# Patient Record
Sex: MALE | Race: WHITE | Employment: FULL TIME | URBAN - NONMETROPOLITAN AREA
[De-identification: names, ages, dates, MRNs, and addresses within clinical notes are randomized per-mention and may not be internally consistent; named-entity substitution may affect disease eponyms.]

---

## 2017-08-23 ENCOUNTER — TELEPHONE (OUTPATIENT)
Dept: PRIMARY CARE CLINIC | Age: 27
End: 2017-08-23

## 2017-08-23 ENCOUNTER — OFFICE VISIT (OUTPATIENT)
Dept: PRIMARY CARE CLINIC | Age: 27
End: 2017-08-23
Payer: MEDICAID

## 2017-08-23 VITALS
OXYGEN SATURATION: 96 % | BODY MASS INDEX: 38.17 KG/M2 | HEART RATE: 78 BPM | DIASTOLIC BLOOD PRESSURE: 86 MMHG | TEMPERATURE: 98 F | SYSTOLIC BLOOD PRESSURE: 136 MMHG | HEIGHT: 74 IN | WEIGHT: 297.4 LBS

## 2017-08-23 DIAGNOSIS — F41.9 ANXIETY: ICD-10-CM

## 2017-08-23 DIAGNOSIS — E55.9 VITAMIN D DEFICIENCY: ICD-10-CM

## 2017-08-23 DIAGNOSIS — F32.A DEPRESSION, UNSPECIFIED DEPRESSION TYPE: ICD-10-CM

## 2017-08-23 DIAGNOSIS — R45.4 OUTBURSTS OF ANGER: Primary | ICD-10-CM

## 2017-08-23 DIAGNOSIS — Z11.4 SCREENING FOR HIV WITHOUT PRESENCE OF RISK FACTORS: ICD-10-CM

## 2017-08-23 PROCEDURE — 99214 OFFICE O/P EST MOD 30 MIN: CPT | Performed by: NURSE PRACTITIONER

## 2017-08-23 RX ORDER — ESCITALOPRAM OXALATE 10 MG/1
10 TABLET ORAL DAILY
Qty: 30 TABLET | Refills: 3 | Status: SHIPPED | OUTPATIENT
Start: 2017-08-23 | End: 2017-12-15 | Stop reason: SDUPTHER

## 2017-08-23 ASSESSMENT — ENCOUNTER SYMPTOMS
GASTROINTESTINAL NEGATIVE: 1
RESPIRATORY NEGATIVE: 1
EYES NEGATIVE: 1

## 2017-08-25 DIAGNOSIS — I10 ESSENTIAL HYPERTENSION: Primary | ICD-10-CM

## 2017-08-25 DIAGNOSIS — E66.3 OVERWEIGHT: ICD-10-CM

## 2017-08-25 DIAGNOSIS — R53.83 FATIGUE, UNSPECIFIED TYPE: ICD-10-CM

## 2017-08-25 DIAGNOSIS — Z13.220 SCREENING, LIPID: ICD-10-CM

## 2017-08-28 DIAGNOSIS — R53.83 FATIGUE, UNSPECIFIED TYPE: ICD-10-CM

## 2017-08-28 DIAGNOSIS — E66.3 OVERWEIGHT: ICD-10-CM

## 2017-08-28 DIAGNOSIS — Z13.220 SCREENING, LIPID: ICD-10-CM

## 2017-08-28 DIAGNOSIS — Z11.4 SCREENING FOR HIV WITHOUT PRESENCE OF RISK FACTORS: ICD-10-CM

## 2017-08-28 LAB
ALBUMIN SERPL-MCNC: 4.3 G/DL (ref 3.5–5.2)
ALP BLD-CCNC: 61 U/L (ref 40–130)
ALT SERPL-CCNC: 15 U/L (ref 5–41)
ANION GAP SERPL CALCULATED.3IONS-SCNC: 14 MMOL/L (ref 7–19)
AST SERPL-CCNC: 14 U/L (ref 5–40)
BILIRUB SERPL-MCNC: 0.8 MG/DL (ref 0.2–1.2)
BUN BLDV-MCNC: 11 MG/DL (ref 6–20)
CALCIUM SERPL-MCNC: 9.7 MG/DL (ref 8.6–10)
CHLORIDE BLD-SCNC: 101 MMOL/L (ref 98–111)
CHOLESTEROL, TOTAL: 181 MG/DL (ref 160–199)
CO2: 28 MMOL/L (ref 22–29)
CREAT SERPL-MCNC: 0.8 MG/DL (ref 0.5–1.2)
GFR NON-AFRICAN AMERICAN: >60
GLUCOSE BLD-MCNC: 95 MG/DL (ref 74–109)
HBA1C MFR BLD: 5 %
HCT VFR BLD CALC: 47.8 % (ref 42–52)
HDLC SERPL-MCNC: 37 MG/DL (ref 55–121)
HEMOGLOBIN: 15.9 G/DL (ref 14–18)
LDL CHOLESTEROL CALCULATED: 126 MG/DL
MCH RBC QN AUTO: 29.6 PG (ref 27–31)
MCHC RBC AUTO-ENTMCNC: 33.3 G/DL (ref 33–37)
MCV RBC AUTO: 89 FL (ref 80–94)
PDW BLD-RTO: 13.4 % (ref 11.5–14.5)
PLATELET # BLD: 302 K/UL (ref 130–400)
PMV BLD AUTO: 11.4 FL (ref 9.4–12.4)
POTASSIUM SERPL-SCNC: 4 MMOL/L (ref 3.5–5)
RBC # BLD: 5.37 M/UL (ref 4.7–6.1)
SODIUM BLD-SCNC: 143 MMOL/L (ref 136–145)
TOTAL PROTEIN: 7.7 G/DL (ref 6.6–8.7)
TRIGL SERPL-MCNC: 89 MG/DL (ref 150–199)
TSH SERPL DL<=0.05 MIU/L-ACNC: 1.84 UIU/ML (ref 0.27–4.2)
VITAMIN B-12: 333 PG/ML (ref 211–946)
WBC # BLD: 7.7 K/UL (ref 4.8–10.8)

## 2017-08-29 LAB — HIV-1 AND HIV-2 ANTIBODIES: NEGATIVE

## 2017-09-22 ENCOUNTER — OFFICE VISIT (OUTPATIENT)
Dept: PRIMARY CARE CLINIC | Age: 27
End: 2017-09-22
Payer: MEDICAID

## 2017-09-22 VITALS
DIASTOLIC BLOOD PRESSURE: 82 MMHG | BODY MASS INDEX: 36.83 KG/M2 | SYSTOLIC BLOOD PRESSURE: 118 MMHG | OXYGEN SATURATION: 98 % | WEIGHT: 287 LBS | HEART RATE: 73 BPM | HEIGHT: 74 IN | TEMPERATURE: 97.7 F

## 2017-09-22 DIAGNOSIS — Z30.09 SCREENING AND EVALUATION FOR VASECTOMY: ICD-10-CM

## 2017-09-22 DIAGNOSIS — F41.9 ANXIETY: Primary | ICD-10-CM

## 2017-09-22 DIAGNOSIS — R45.4 OUTBURSTS OF ANGER: ICD-10-CM

## 2017-09-22 DIAGNOSIS — Z78.9 RELIES ON VASECTOMY AS PRIMARY BIRTH CONTROL METHOD: ICD-10-CM

## 2017-09-22 PROCEDURE — 99214 OFFICE O/P EST MOD 30 MIN: CPT | Performed by: NURSE PRACTITIONER

## 2017-09-22 RX ORDER — ERGOCALCIFEROL 1.25 MG/1
50000 CAPSULE ORAL WEEKLY
COMMUNITY
End: 2022-10-03

## 2017-09-22 ASSESSMENT — ENCOUNTER SYMPTOMS
EYES NEGATIVE: 1
RESPIRATORY NEGATIVE: 1
GASTROINTESTINAL NEGATIVE: 1

## 2017-10-04 ENCOUNTER — OFFICE VISIT (OUTPATIENT)
Dept: UROLOGY | Facility: CLINIC | Age: 27
End: 2017-10-04

## 2017-10-04 VITALS
SYSTOLIC BLOOD PRESSURE: 138 MMHG | BODY MASS INDEX: 37.35 KG/M2 | HEIGHT: 74 IN | TEMPERATURE: 97.7 F | WEIGHT: 291 LBS | DIASTOLIC BLOOD PRESSURE: 86 MMHG

## 2017-10-04 DIAGNOSIS — Z30.09 ENCOUNTER FOR VASECTOMY COUNSELING: Primary | ICD-10-CM

## 2017-10-04 PROCEDURE — 99203 OFFICE O/P NEW LOW 30 MIN: CPT | Performed by: UROLOGY

## 2017-10-04 RX ORDER — ESCITALOPRAM OXALATE 10 MG/1
TABLET ORAL
COMMUNITY
Start: 2017-08-23

## 2017-10-04 RX ORDER — NICOTINE POLACRILEX 4 MG/1
GUM, CHEWING ORAL
COMMUNITY
Start: 2016-05-13

## 2017-10-04 RX ORDER — ERGOCALCIFEROL 1.25 MG/1
CAPSULE ORAL
COMMUNITY

## 2017-10-04 RX ORDER — HYDROCODONE BITARTRATE AND ACETAMINOPHEN 7.5; 325 MG/1; MG/1
1 TABLET ORAL EVERY 6 HOURS PRN
Qty: 24 TABLET | Refills: 0 | Status: SHIPPED | OUTPATIENT
Start: 2017-10-04 | End: 2017-11-22

## 2017-10-04 RX ORDER — DIAZEPAM 10 MG/1
10 TABLET ORAL 2 TIMES DAILY PRN
Qty: 1 TABLET | Refills: 0 | Status: SHIPPED | OUTPATIENT
Start: 2017-10-04 | End: 2017-11-22

## 2017-11-13 ENCOUNTER — OFFICE VISIT (OUTPATIENT)
Dept: UROLOGY | Facility: CLINIC | Age: 27
End: 2017-11-13

## 2017-11-13 DIAGNOSIS — Z30.2 ENCOUNTER FOR VASECTOMY: Primary | ICD-10-CM

## 2017-11-13 PROCEDURE — 55250 REMOVAL OF SPERM DUCT(S): CPT | Performed by: UROLOGY

## 2017-11-15 RX ORDER — PERMETHRIN 50 MG/G
CREAM TOPICAL
Qty: 60 G | Refills: 1 | Status: SHIPPED | OUTPATIENT
Start: 2017-11-15 | End: 2022-10-03

## 2017-11-15 RX ORDER — HYDROXYZINE HYDROCHLORIDE 25 MG/1
25 TABLET, FILM COATED ORAL 3 TIMES DAILY PRN
Qty: 30 TABLET | Refills: 1 | Status: SHIPPED | OUTPATIENT
Start: 2017-11-15 | End: 2017-11-25

## 2017-11-20 ENCOUNTER — TELEPHONE (OUTPATIENT)
Dept: UROLOGY | Facility: CLINIC | Age: 27
End: 2017-11-20

## 2017-11-22 ENCOUNTER — OFFICE VISIT (OUTPATIENT)
Dept: UROLOGY | Facility: CLINIC | Age: 27
End: 2017-11-22

## 2017-11-22 VITALS — BODY MASS INDEX: 36.63 KG/M2 | WEIGHT: 276.4 LBS | HEIGHT: 73 IN | TEMPERATURE: 97.4 F

## 2017-11-22 DIAGNOSIS — S30.22XA HEMATOMA OF SCROTUM: Primary | ICD-10-CM

## 2017-11-22 DIAGNOSIS — N45.1 EPIDIDYMITIS: ICD-10-CM

## 2017-11-22 LAB
BILIRUB BLD-MCNC: ABNORMAL MG/DL
CLARITY, POC: CLEAR
COLOR UR: YELLOW
GLUCOSE UR STRIP-MCNC: NEGATIVE MG/DL
KETONES UR QL: NEGATIVE
LEUKOCYTE EST, POC: NEGATIVE
NITRITE UR-MCNC: NEGATIVE MG/ML
PH UR: 5 [PH] (ref 5–8)
PROT UR STRIP-MCNC: ABNORMAL MG/DL
RBC # UR STRIP: NEGATIVE /UL
SP GR UR: 1.03 (ref 1–1.03)
UROBILINOGEN UR QL: ABNORMAL

## 2017-11-22 PROCEDURE — 81003 URINALYSIS AUTO W/O SCOPE: CPT | Performed by: UROLOGY

## 2017-11-22 PROCEDURE — 99024 POSTOP FOLLOW-UP VISIT: CPT | Performed by: UROLOGY

## 2017-11-22 RX ORDER — CIPROFLOXACIN 500 MG/1
500 TABLET, FILM COATED ORAL 2 TIMES DAILY
Qty: 28 TABLET | Refills: 0 | Status: SHIPPED | OUTPATIENT
Start: 2017-11-22 | End: 2017-12-06

## 2017-11-30 ENCOUNTER — OFFICE VISIT (OUTPATIENT)
Dept: PRIMARY CARE CLINIC | Age: 27
End: 2017-11-30
Payer: MEDICAID

## 2017-11-30 VITALS
BODY MASS INDEX: 37.11 KG/M2 | TEMPERATURE: 97.4 F | RESPIRATION RATE: 18 BRPM | HEART RATE: 60 BPM | HEIGHT: 73 IN | WEIGHT: 280 LBS | OXYGEN SATURATION: 96 % | SYSTOLIC BLOOD PRESSURE: 112 MMHG | DIASTOLIC BLOOD PRESSURE: 79 MMHG

## 2017-11-30 DIAGNOSIS — B35.9 TINEA: Primary | ICD-10-CM

## 2017-11-30 PROCEDURE — 99213 OFFICE O/P EST LOW 20 MIN: CPT | Performed by: NURSE PRACTITIONER

## 2017-11-30 PROCEDURE — G8484 FLU IMMUNIZE NO ADMIN: HCPCS | Performed by: NURSE PRACTITIONER

## 2017-11-30 PROCEDURE — 4004F PT TOBACCO SCREEN RCVD TLK: CPT | Performed by: NURSE PRACTITIONER

## 2017-11-30 PROCEDURE — G8417 CALC BMI ABV UP PARAM F/U: HCPCS | Performed by: NURSE PRACTITIONER

## 2017-11-30 PROCEDURE — G8427 DOCREV CUR MEDS BY ELIG CLIN: HCPCS | Performed by: NURSE PRACTITIONER

## 2017-11-30 RX ORDER — TERBINAFINE HYDROCHLORIDE 250 MG/1
250 TABLET ORAL DAILY
Qty: 30 TABLET | Refills: 1 | Status: SHIPPED | OUTPATIENT
Start: 2017-11-30 | End: 2022-10-03

## 2017-11-30 ASSESSMENT — ENCOUNTER SYMPTOMS
GASTROINTESTINAL NEGATIVE: 1
RESPIRATORY NEGATIVE: 1
EYES NEGATIVE: 1

## 2017-11-30 NOTE — PROGRESS NOTES
[facility]  [address]  [address] [facility] [address]  Dept: [phone]  Dept Fax: [phone]  Loc: [phone]    Anuja Ospina is a 32 y.o. male who presents today for his medical conditions/complaints as noted below. Anuja Ospina is c/o of Other (itching)      Chief Complaint   Patient presents with    Other     itching       HPI:     HPI   Patient here for follow up on rash. Patient reports using Elimite cream as directed without much relief. He has had to use Atarax nightly due to the itching as well. He denies that is spreading, though it is not improving. Past Medical History:   Diagnosis Date    BiPAP (biphasic positive airway pressure) dependence     16cm/12cm    GERD (gastroesophageal reflux disease)     Hypertension     Obstructive sleep apnea     AHI:  24.3    Restless leg syndrome         Past Surgical History:   Procedure Laterality Date    CYST REMOVAL      lower back    HAND SURGERY      5 total surgeries    SLEEVE GASTRECTOMY  03/2017       Social History   Substance Use Topics    Smoking status: Never Smoker    Smokeless tobacco: Current User     Types: Chew    Alcohol use 0.0 oz/week      Comment: rarely        Current Outpatient Prescriptions   Medication Sig Dispense Refill    terbinafine (LAMISIL) 250 MG tablet Take 1 tablet by mouth daily 30 tablet 1    permethrin (ELIMITE) 5 % cream Apply topically as directed. May repeat in 14 days if not better. 60 g 1    vitamin D (ERGOCALCIFEROL) 45141 units CAPS capsule Take 50,000 Units by mouth once a week      escitalopram (LEXAPRO) 10 MG tablet Take 1 tablet by mouth daily 30 tablet 3    Omeprazole 20 MG TBEC Take 1 capsule by mouth daily 30 tablet 5     No current facility-administered medications for this visit.         No Known Allergies    Family History   Problem Relation Age of Onset    Thyroid Disease Mother     High Blood Pressure Mother     High Blood

## 2017-12-15 RX ORDER — ESCITALOPRAM OXALATE 20 MG/1
20 TABLET ORAL DAILY
Qty: 30 TABLET | Refills: 1 | Status: SHIPPED | OUTPATIENT
Start: 2017-12-15 | End: 2018-01-03 | Stop reason: SDUPTHER

## 2018-01-03 RX ORDER — ESCITALOPRAM OXALATE 20 MG/1
20 TABLET ORAL DAILY
Qty: 30 TABLET | Refills: 1 | Status: SHIPPED | OUTPATIENT
Start: 2018-01-03 | End: 2022-10-03

## 2018-01-10 ENCOUNTER — TELEPHONE (OUTPATIENT)
Dept: UROLOGY | Facility: CLINIC | Age: 28
End: 2018-01-10

## 2018-02-08 ENCOUNTER — TELEPHONE (OUTPATIENT)
Dept: UROLOGY | Facility: CLINIC | Age: 28
End: 2018-02-08

## 2020-02-24 ENCOUNTER — OFFICE VISIT (OUTPATIENT)
Dept: UROLOGY | Facility: CLINIC | Age: 30
End: 2020-02-24

## 2020-02-24 VITALS — HEIGHT: 74 IN | WEIGHT: 239 LBS | BODY MASS INDEX: 30.67 KG/M2 | TEMPERATURE: 96.8 F

## 2020-02-24 DIAGNOSIS — I86.1 VARICOCELE: Primary | ICD-10-CM

## 2020-02-24 DIAGNOSIS — N50.89 SPERM GRANULOMA: ICD-10-CM

## 2020-02-24 PROCEDURE — 99213 OFFICE O/P EST LOW 20 MIN: CPT | Performed by: UROLOGY

## 2020-02-24 RX ORDER — SERTRALINE HYDROCHLORIDE 25 MG/1
25 TABLET, FILM COATED ORAL DAILY
COMMUNITY

## 2020-08-27 ENCOUNTER — TELEPHONE (OUTPATIENT)
Dept: UROLOGY | Facility: CLINIC | Age: 30
End: 2020-08-27

## 2020-08-27 DIAGNOSIS — N50.89 SPERM GRANULOMA: Primary | ICD-10-CM

## 2020-08-28 ENCOUNTER — LAB (OUTPATIENT)
Dept: LAB | Facility: HOSPITAL | Age: 30
End: 2020-08-28

## 2020-08-28 DIAGNOSIS — N50.89 SPERM GRANULOMA: ICD-10-CM

## 2020-08-28 LAB — SPERM - POST VASECTOMY: NORMAL

## 2020-08-28 PROCEDURE — 89321 SEMEN ANAL SPERM DETECTION: CPT | Performed by: UROLOGY

## 2020-08-31 ENCOUNTER — TELEPHONE (OUTPATIENT)
Dept: UROLOGY | Facility: CLINIC | Age: 30
End: 2020-08-31

## 2020-09-15 ENCOUNTER — TRANSCRIBE ORDERS (OUTPATIENT)
Dept: PHYSICAL THERAPY | Facility: CLINIC | Age: 30
End: 2020-09-15

## 2020-09-15 DIAGNOSIS — H43.392 VISUAL FLOATERS, LEFT: ICD-10-CM

## 2020-09-15 DIAGNOSIS — H53.2 DIPLOPIA: ICD-10-CM

## 2020-09-15 DIAGNOSIS — S06.0X0A CONCUSSION WITH NO LOSS OF CONSCIOUSNESS, INITIAL ENCOUNTER: Primary | ICD-10-CM

## 2020-09-15 DIAGNOSIS — H51.11 CONVERGENCE INSUFFICIENCY: ICD-10-CM

## 2020-09-15 DIAGNOSIS — G44.329 CHRONIC POST-TRAUMATIC HEADACHE, NOT INTRACTABLE: ICD-10-CM

## 2020-09-15 DIAGNOSIS — R94.113 ABNORMAL RESULT OF EVALUATION OF OCULOMOTOR SYSTEM: ICD-10-CM

## 2020-09-28 ENCOUNTER — TREATMENT (OUTPATIENT)
Dept: PHYSICAL THERAPY | Facility: CLINIC | Age: 30
End: 2020-09-28

## 2020-09-28 DIAGNOSIS — G44.309 POST-TRAUMATIC HEADACHE, NOT INTRACTABLE, UNSPECIFIED CHRONICITY PATTERN: ICD-10-CM

## 2020-09-28 DIAGNOSIS — S06.0X0A CONCUSSION WITHOUT LOSS OF CONSCIOUSNESS, INITIAL ENCOUNTER: ICD-10-CM

## 2020-09-28 DIAGNOSIS — H51.11 CONVERGENCE INSUFFICIENCY: Primary | ICD-10-CM

## 2020-09-28 PROCEDURE — 97162 PT EVAL MOD COMPLEX 30 MIN: CPT | Performed by: PHYSICAL THERAPIST

## 2022-10-03 ENCOUNTER — OFFICE VISIT (OUTPATIENT)
Dept: FAMILY MEDICINE CLINIC | Age: 32
End: 2022-10-03
Payer: COMMERCIAL

## 2022-10-03 VITALS
OXYGEN SATURATION: 99 % | BODY MASS INDEX: 27.21 KG/M2 | TEMPERATURE: 97.3 F | HEART RATE: 74 BPM | DIASTOLIC BLOOD PRESSURE: 66 MMHG | WEIGHT: 212 LBS | HEIGHT: 74 IN | SYSTOLIC BLOOD PRESSURE: 118 MMHG

## 2022-10-03 DIAGNOSIS — Z98.84 HX OF BARIATRIC SURGERY: ICD-10-CM

## 2022-10-03 DIAGNOSIS — J45.20 MILD INTERMITTENT ASTHMA, UNSPECIFIED WHETHER COMPLICATED: ICD-10-CM

## 2022-10-03 DIAGNOSIS — Z00.00 ENCOUNTER FOR WELLNESS EXAMINATION IN ADULT: ICD-10-CM

## 2022-10-03 DIAGNOSIS — Z00.00 ENCOUNTER FOR WELLNESS EXAMINATION IN ADULT: Primary | ICD-10-CM

## 2022-10-03 DIAGNOSIS — R41.840 ATTENTION DEFICIT: ICD-10-CM

## 2022-10-03 LAB
ALBUMIN SERPL-MCNC: 4.6 G/DL (ref 3.5–5.2)
ALP BLD-CCNC: 41 U/L (ref 40–130)
ALT SERPL-CCNC: 22 U/L (ref 5–41)
ANION GAP SERPL CALCULATED.3IONS-SCNC: 15 MMOL/L (ref 7–19)
AST SERPL-CCNC: 21 U/L (ref 5–40)
BASOPHILS ABSOLUTE: 0.1 K/UL (ref 0–0.2)
BASOPHILS RELATIVE PERCENT: 1.3 % (ref 0–1)
BILIRUB SERPL-MCNC: 0.5 MG/DL (ref 0.2–1.2)
BUN BLDV-MCNC: 9 MG/DL (ref 6–20)
CALCIUM SERPL-MCNC: 9.2 MG/DL (ref 8.6–10)
CHLORIDE BLD-SCNC: 105 MMOL/L (ref 98–111)
CHOLESTEROL, TOTAL: 192 MG/DL (ref 160–199)
CO2: 27 MMOL/L (ref 22–29)
CREAT SERPL-MCNC: 0.8 MG/DL (ref 0.5–1.2)
EOSINOPHILS ABSOLUTE: 0.6 K/UL (ref 0–0.6)
EOSINOPHILS RELATIVE PERCENT: 8.6 % (ref 0–5)
FOLATE: 6.5 NG/ML (ref 4.5–32.2)
GFR AFRICAN AMERICAN: >59
GFR NON-AFRICAN AMERICAN: >60
GLUCOSE BLD-MCNC: 84 MG/DL (ref 74–109)
HCT VFR BLD CALC: 46.1 % (ref 42–52)
HDLC SERPL-MCNC: 64 MG/DL (ref 55–121)
HEMOGLOBIN: 14.7 G/DL (ref 14–18)
IMMATURE GRANULOCYTES #: 0 K/UL
IRON SATURATION: 25 % (ref 14–50)
IRON: 90 UG/DL (ref 59–158)
LDL CHOLESTEROL CALCULATED: 114 MG/DL
LYMPHOCYTES ABSOLUTE: 1.9 K/UL (ref 1.1–4.5)
LYMPHOCYTES RELATIVE PERCENT: 28.2 % (ref 20–40)
MCH RBC QN AUTO: 30.6 PG (ref 27–31)
MCHC RBC AUTO-ENTMCNC: 31.9 G/DL (ref 33–37)
MCV RBC AUTO: 96 FL (ref 80–94)
MONOCYTES ABSOLUTE: 0.7 K/UL (ref 0–0.9)
MONOCYTES RELATIVE PERCENT: 9.9 % (ref 0–10)
NEUTROPHILS ABSOLUTE: 3.5 K/UL (ref 1.5–7.5)
NEUTROPHILS RELATIVE PERCENT: 51.7 % (ref 50–65)
PDW BLD-RTO: 13 % (ref 11.5–14.5)
PLATELET # BLD: 264 K/UL (ref 130–400)
PMV BLD AUTO: 11.7 FL (ref 9.4–12.4)
POTASSIUM SERPL-SCNC: 4.4 MMOL/L (ref 3.5–5)
RBC # BLD: 4.8 M/UL (ref 4.7–6.1)
SODIUM BLD-SCNC: 147 MMOL/L (ref 136–145)
TOTAL IRON BINDING CAPACITY: 354 UG/DL (ref 250–400)
TOTAL PROTEIN: 7 G/DL (ref 6.6–8.7)
TRIGL SERPL-MCNC: 70 MG/DL (ref 0–149)
TSH SERPL DL<=0.05 MIU/L-ACNC: 1.57 UIU/ML (ref 0.27–4.2)
VITAMIN B-12: 291 PG/ML (ref 211–946)
VITAMIN D 25-HYDROXY: 25.2 NG/ML
WBC # BLD: 6.8 K/UL (ref 4.8–10.8)

## 2022-10-03 PROCEDURE — 99395 PREV VISIT EST AGE 18-39: CPT | Performed by: FAMILY MEDICINE

## 2022-10-03 RX ORDER — ALBUTEROL SULFATE 90 UG/1
2 AEROSOL, METERED RESPIRATORY (INHALATION) EVERY 6 HOURS PRN
Qty: 18 G | Refills: 3 | Status: SHIPPED | OUTPATIENT
Start: 2022-10-03

## 2022-10-03 RX ORDER — ALBUTEROL SULFATE 2.5 MG/3ML
2.5 SOLUTION RESPIRATORY (INHALATION) EVERY 6 HOURS PRN
Qty: 120 EACH | Refills: 3 | Status: SHIPPED | OUTPATIENT
Start: 2022-10-03

## 2022-10-03 ASSESSMENT — PATIENT HEALTH QUESTIONNAIRE - PHQ9
1. LITTLE INTEREST OR PLEASURE IN DOING THINGS: 0
SUM OF ALL RESPONSES TO PHQ QUESTIONS 1-9: 0
SUM OF ALL RESPONSES TO PHQ9 QUESTIONS 1 & 2: 0
2. FEELING DOWN, DEPRESSED OR HOPELESS: 0
SUM OF ALL RESPONSES TO PHQ QUESTIONS 1-9: 0

## 2022-10-03 ASSESSMENT — ENCOUNTER SYMPTOMS
ABDOMINAL PAIN: 0
DIARRHEA: 0
CONSTIPATION: 0
NAUSEA: 0
RHINORRHEA: 0
COUGH: 0
VOMITING: 0
SHORTNESS OF BREATH: 0
BACK PAIN: 0

## 2022-10-03 NOTE — PROGRESS NOTES
10/3/2022    Soha Padron (:  1990) is a 28 y.o. male, here for a preventive medicine evaluation. Patient Active Problem List   Diagnosis    Hypertension    GERD (gastroesophageal reflux disease)    Obstructive sleep apnea    Restless leg syndrome    Vitamin D deficiency    Anxiety     Patient reports that he has a history of attention deficit concerns with scattered brain and has some issues with anxiety. He just feels like too many things are going on and has problems retaining his retentions. He does state that he gets distracted easily. He states that things take him longer to complete and that makes him frustrated and mad. Has a history of asthma and states that he has been doing better and hasn't had much of an issue. He states that he does occasionally need/use albuterol but is not a regular thing for him. He does have history of gastric sleeve and has been doing well but his appetite isn't really the greatest but is doing well. He states that his sleep is doing okay. He does stay active and does exercise. Review of Systems   Constitutional:  Negative for activity change, appetite change, fatigue and fever. HENT:  Negative for congestion and rhinorrhea. Respiratory:  Negative for cough and shortness of breath. Cardiovascular:  Negative for chest pain and palpitations. Gastrointestinal:  Negative for abdominal pain, constipation, diarrhea, nausea and vomiting. Genitourinary:  Negative for dysuria and hematuria. Musculoskeletal:  Negative for back pain, gait problem and neck pain. Skin:  Negative for rash and wound. Neurological:  Negative for syncope and weakness. Hematological:  Negative for adenopathy. Does not bruise/bleed easily. Psychiatric/Behavioral:  Positive for decreased concentration. Negative for dysphoric mood and sleep disturbance. The patient is not nervous/anxious. Prior to Visit Medications    Medication Sig Taking?  Authorizing Provider   albuterol (PROVENTIL) (2.5 MG/3ML) 0.083% nebulizer solution Take 3 mLs by nebulization every 6 hours as needed for Wheezing Yes Lyndsey Tao MD   albuterol sulfate HFA (PROVENTIL HFA) 108 (90 Base) MCG/ACT inhaler Inhale 2 puffs into the lungs every 6 hours as needed for Wheezing Yes Lyndsey Tao MD        No Known Allergies    Past Medical History:   Diagnosis Date    BiPAP (biphasic positive airway pressure) dependence     16cm/12cm    GERD (gastroesophageal reflux disease)     Hypertension     Obstructive sleep apnea     AHI:  24.3    Restless leg syndrome        Past Surgical History:   Procedure Laterality Date    CYST REMOVAL      lower back    HAND SURGERY      5 total surgeries    SLEEVE GASTRECTOMY  03/2017       Social History     Socioeconomic History    Marital status:      Spouse name: Not on file    Number of children: Not on file    Years of education: Not on file    Highest education level: Not on file   Occupational History    Not on file   Tobacco Use    Smoking status: Never    Smokeless tobacco: Current     Types: Chew   Substance and Sexual Activity    Alcohol use:  Yes     Alcohol/week: 0.0 standard drinks     Comment: rarely    Drug use: No    Sexual activity: Yes     Partners: Female   Other Topics Concern    Not on file   Social History Narrative    Not on file     Social Determinants of Health     Financial Resource Strain: Not on file   Food Insecurity: Not on file   Transportation Needs: Not on file   Physical Activity: Not on file   Stress: Not on file   Social Connections: Not on file   Intimate Partner Violence: Not on file   Housing Stability: Not on file        Family History   Problem Relation Age of Onset    Thyroid Disease Mother     High Blood Pressure Mother     High Blood Pressure Father     Cancer Father     Thyroid Disease Sister        ADVANCE DIRECTIVE: N, <no information>    Vitals:    10/03/22 0726   BP: 118/66   Site: Left Upper Arm   Position: Sitting Cuff Size: Large Adult   Pulse: 74   Temp: 97.3 °F (36.3 °C)   TempSrc: Temporal   SpO2: 99%   Weight: 212 lb (96.2 kg)   Height: 6' 2\" (1.88 m)     Estimated body mass index is 27.22 kg/m² as calculated from the following:    Height as of this encounter: 6' 2\" (1.88 m). Weight as of this encounter: 212 lb (96.2 kg). Physical Exam  Vitals and nursing note reviewed. Constitutional:       General: He is not in acute distress. Appearance: Normal appearance. He is well-developed. He is not diaphoretic. HENT:      Head: Normocephalic and atraumatic. Right Ear: External ear normal.      Left Ear: External ear normal.      Nose: Nose normal.      Mouth/Throat:      Mouth: Mucous membranes are moist.      Pharynx: Oropharynx is clear. No oropharyngeal exudate. Eyes:      General: No scleral icterus. Right eye: No discharge. Left eye: No discharge. Conjunctiva/sclera: Conjunctivae normal.   Neck:      Trachea: No tracheal deviation. Cardiovascular:      Rate and Rhythm: Normal rate and regular rhythm. Heart sounds: Normal heart sounds. No murmur heard. No friction rub. No gallop. Pulmonary:      Effort: Pulmonary effort is normal. No respiratory distress. Breath sounds: Normal breath sounds. No wheezing or rales. Abdominal:      General: Bowel sounds are normal. There is no distension. Palpations: Abdomen is soft. Tenderness: There is no abdominal tenderness. Musculoskeletal:         General: No deformity (No gross deformities of upper or lower extremities) or signs of injury. Normal range of motion. Cervical back: Normal range of motion and neck supple. No muscular tenderness. Right lower leg: No edema. Left lower leg: No edema. Lymphadenopathy:      Cervical: No cervical adenopathy. Skin:     General: Skin is warm and dry. Findings: No erythema or rash.    Neurological:      Mental Status: He is alert and oriented to person, place, and time. Cranial Nerves: No cranial nerve deficit. Psychiatric:         Mood and Affect: Mood normal.         Behavior: Behavior normal.         Thought Content: Thought content normal.       No flowsheet data found. Lab Results   Component Value Date/Time    CHOL 181 08/28/2017 08:38 AM    CHOL 176 05/13/2016 10:19 AM    TRIG 89 08/28/2017 08:38 AM    TRIG 55 05/13/2016 10:19 AM    HDL 37 08/28/2017 08:38 AM    HDL 41 05/13/2016 10:19 AM    LDLCALC 126 08/28/2017 08:38 AM    LDLCALC 124 05/13/2016 10:19 AM    GLUCOSE 86 11/30/2017 09:04 AM    LABA1C 5.0 08/28/2017 08:38 AM       The ASCVD Risk score (Shay CHAPARRO, et al., 2019) failed to calculate for the following reasons: The 2019 ASCVD risk score is only valid for ages 36 to 78      There is no immunization history on file for this patient. Health Maintenance   Topic Date Due    COVID-19 Vaccine (1) Never done    Varicella vaccine (1 of 2 - 2-dose childhood series) Never done    Depression Screen  Never done    Hepatitis C screen  Never done    DTaP/Tdap/Td vaccine (1 - Tdap) Never done    Flu vaccine (1) Never done    HIV screen  Completed    Hepatitis A vaccine  Aged Out    Hepatitis B vaccine  Aged Out    Hib vaccine  Aged Out    Meningococcal (ACWY) vaccine  Aged Out    Pneumococcal 0-64 years Vaccine  Aged Out       Assessment & Plan   Encounter for wellness examination in adult  -     CBC with Auto Differential; Future  -     Comprehensive Metabolic Panel; Future  -     Lipid Panel; Future  -     TSH; Future  Attention deficit  -     External Referral To Behavioral Health  Mild intermittent asthma, unspecified whether complicated  -     albuterol (PROVENTIL) (2.5 MG/3ML) 0.083% nebulizer solution; Take 3 mLs by nebulization every 6 hours as needed for Wheezing, Disp-120 each, R-3Normal  -     albuterol sulfate HFA (PROVENTIL HFA) 108 (90 Base) MCG/ACT inhaler;  Inhale 2 puffs into the lungs every 6 hours as needed for Wheezing, Disp-18 g, R-3Normal  Hx of bariatric surgery  -     Folate; Future  -     Vitamin B12; Future  -     Iron and TIBC; Future  -     Vitamin D 25 Hydroxy; Future  Return if symptoms worsen or fail to improve, for next scheduled follow up with PCP. Discussed age-appropriate anticipatory topics (including, but not limited to, continue healthy diet and good water intake, limit soda and junk food, regular exercise, dental care) and gave handout on age appropriate topics  Discussed referral to behavioral health for evaluation of the possibility of ADHD. Discussed potential management options moving forward depending on evaluation results. Discussed lab work for further evaluation. Discussed proper use of albuterol for his asthma history we will continue to monitor with adjustments per dictates. Discussed signs symptoms require medical attention. All questions were answered and patient voiced understanding and agreement with plan as discussed.     --Joshua Carbone MD

## 2022-10-18 ENCOUNTER — TELEPHONE (OUTPATIENT)
Dept: FAMILY MEDICINE CLINIC | Age: 32
End: 2022-10-18

## 2022-10-18 DIAGNOSIS — E55.9 VITAMIN D DEFICIENCY: Primary | ICD-10-CM

## 2022-10-18 RX ORDER — ERGOCALCIFEROL 1.25 MG/1
50000 CAPSULE ORAL WEEKLY
Qty: 12 CAPSULE | Refills: 1 | Status: SHIPPED | OUTPATIENT
Start: 2022-10-18

## 2022-11-22 ENCOUNTER — PATIENT MESSAGE (OUTPATIENT)
Dept: FAMILY MEDICINE CLINIC | Age: 32
End: 2022-11-22

## 2022-12-06 ENCOUNTER — OFFICE VISIT (OUTPATIENT)
Dept: FAMILY MEDICINE CLINIC | Age: 32
End: 2022-12-06
Payer: COMMERCIAL

## 2022-12-06 VITALS
HEIGHT: 74 IN | DIASTOLIC BLOOD PRESSURE: 74 MMHG | WEIGHT: 211 LBS | SYSTOLIC BLOOD PRESSURE: 118 MMHG | HEART RATE: 98 BPM | TEMPERATURE: 98 F | OXYGEN SATURATION: 99 % | BODY MASS INDEX: 27.08 KG/M2

## 2022-12-06 DIAGNOSIS — F90.2 ATTENTION DEFICIT HYPERACTIVITY DISORDER (ADHD), COMBINED TYPE: Primary | ICD-10-CM

## 2022-12-06 DIAGNOSIS — M77.12 LATERAL EPICONDYLITIS OF LEFT ELBOW: ICD-10-CM

## 2022-12-06 PROCEDURE — 3074F SYST BP LT 130 MM HG: CPT | Performed by: FAMILY MEDICINE

## 2022-12-06 PROCEDURE — 99214 OFFICE O/P EST MOD 30 MIN: CPT | Performed by: FAMILY MEDICINE

## 2022-12-06 PROCEDURE — 3078F DIAST BP <80 MM HG: CPT | Performed by: FAMILY MEDICINE

## 2022-12-06 RX ORDER — METHYLPREDNISOLONE 4 MG/1
TABLET ORAL
Qty: 1 KIT | Refills: 0 | Status: SHIPPED | OUTPATIENT
Start: 2022-12-06 | End: 2022-12-12

## 2022-12-06 RX ORDER — DEXTROAMPHETAMINE SACCHARATE, AMPHETAMINE ASPARTATE MONOHYDRATE, DEXTROAMPHETAMINE SULFATE AND AMPHETAMINE SULFATE 5; 5; 5; 5 MG/1; MG/1; MG/1; MG/1
20 CAPSULE, EXTENDED RELEASE ORAL EVERY MORNING
Qty: 30 CAPSULE | Refills: 0 | Status: SHIPPED | OUTPATIENT
Start: 2022-12-06 | End: 2023-12-06

## 2022-12-06 RX ORDER — DICLOFENAC SODIUM 75 MG/1
75 TABLET, DELAYED RELEASE ORAL 2 TIMES DAILY
Qty: 60 TABLET | Refills: 3 | Status: SHIPPED | OUTPATIENT
Start: 2022-12-06

## 2022-12-06 NOTE — PROGRESS NOTES
Doc CALHOUN [facility]  [address] [address]  Dept: [phone]  Dept Fax: [phone]    Visit type: Established patient    Reason for Visit: Discuss Medications (ADHD)         Assessment and Plan       1. Attention deficit hyperactivity disorder (ADHD), combined type  -     amphetamine-dextroamphetamine (ADDERALL XR) 20 MG extended release capsule; Take 1 capsule by mouth every morning., Disp-30 capsule, R-0Normal  2. Lateral epicondylitis of left elbow  -     diclofenac (VOLTAREN) 75 MG EC tablet; Take 1 tablet by mouth 2 times daily, Disp-60 tablet, R-3Normal  -     methylPREDNISolone (MEDROL DOSEPACK) 4 MG tablet; Take by mouth., Disp-1 kit, R-0Normal    With patient being formally diagnosed with ADHD by mental health specialist we discussed medications and through shared decision making determination was to pursue a trial of Adderall in efforts to improve his symptoms. Discussed the potential need for medication adjustments and we will plan on follow-up in 1 month for determination on how the medicine did for many adjustments that might be needed. Discussed potential side effects with the medication. Discussed physical exam findings and suggestive diagnosis of his left elbow pain. Discussed proper care of the involved elbow and expected course. Discussed lifestyle modifications advised beneficial.   Discussed signs and symptoms requiring medical attention. All questions were answered and patient voiced understanding and agreement with plan as discussed. Return in about 4 weeks (around 1/3/2023), or if symptoms worsen or fail to improve. Subjective       HPI   Has been to see [facility] and has been formally diagnosed with ADHD. He states that he has been having problems focusing and staying on task and completing his work.  He states that he is having problems completing his job duties due to getting distracted and jumps around doing stuff instead of getting each item done. He states that at home he is able to cope more with his symptoms and has more of a support symptom to keep him on track. Patient does report that he has been having some pain involving his left elbow more so on the lateral side. Denies any specific injury contributing to the elbow pain. Review of Systems   Constitutional:  Negative for activity change, appetite change, fatigue and fever. HENT:  Negative for congestion and rhinorrhea. Respiratory:  Negative for cough and shortness of breath. Cardiovascular:  Negative for chest pain and palpitations. Gastrointestinal:  Negative for abdominal pain, constipation, diarrhea, nausea and vomiting. Genitourinary:  Negative for dysuria and hematuria. Musculoskeletal:  Positive for arthralgias. Negative for back pain, gait problem and neck pain. Skin:  Negative for rash and wound. Neurological:  Negative for syncope and weakness. Hematological:  Negative for adenopathy. Does not bruise/bleed easily. Psychiatric/Behavioral:  Positive for decreased concentration. Negative for dysphoric mood and sleep disturbance. The patient is not nervous/anxious. No Known Allergies    Outpatient Medications Prior to Visit   Medication Sig Dispense Refill    vitamin D (ERGOCALCIFEROL) 1.25 MG (42290 UT) CAPS capsule Take 1 capsule by mouth once a week 12 capsule 1    albuterol (PROVENTIL) (2.5 MG/3ML) 0.083% nebulizer solution Take 3 mLs by nebulization every 6 hours as needed for Wheezing 120 each 3    albuterol sulfate HFA (PROVENTIL HFA) 108 (90 Base) MCG/ACT inhaler Inhale 2 puffs into the lungs every 6 hours as needed for Wheezing 18 g 3     No facility-administered medications prior to visit.         Past Medical History:   Diagnosis Date    BiPAP (biphasic positive airway pressure) dependence     16cm/12cm    GERD (gastroesophageal reflux disease)     Hypertension     Obstructive sleep apnea     AHI:  24.3    Restless leg syndrome         Social History     Tobacco Use    Smoking status: Never    Smokeless tobacco: Current     Types: Chew   Substance Use Topics    Alcohol use: Yes     Alcohol/week: 0.0 standard drinks     Comment: rarely        Past Surgical History:   Procedure Laterality Date    CYST REMOVAL      lower back    HAND SURGERY      5 total surgeries    SLEEVE GASTRECTOMY  03/2017       Family History   Problem Relation Age of Onset    Thyroid Disease Mother     High Blood Pressure Mother     High Blood Pressure Father     Cancer Father     Thyroid Disease Sister        Objective       /74 (Site: Right Upper Arm, Position: Sitting, Cuff Size: Medium Adult)   Pulse 98   Temp 98 °F (36.7 °C) (Temporal)   Ht 6' 2\" (1.88 m)   Wt 211 lb (95.7 kg)   SpO2 99%   BMI 27.09 kg/m²   Physical Exam  Vitals and nursing note reviewed. Constitutional:       General: He is not in acute distress. Appearance: Normal appearance. He is well-developed. He is not diaphoretic. HENT:      Head: Normocephalic and atraumatic. Right Ear: External ear normal.      Left Ear: External ear normal.      Nose: Nose normal.      Mouth/Throat:      Mouth: Mucous membranes are moist.      Pharynx: Oropharynx is clear. No oropharyngeal exudate. Eyes:      General: No scleral icterus. Right eye: No discharge. Left eye: No discharge. Conjunctiva/sclera: Conjunctivae normal.   Neck:      Trachea: No tracheal deviation. Cardiovascular:      Rate and Rhythm: Normal rate and regular rhythm. Heart sounds: Normal heart sounds. No murmur heard. No friction rub. No gallop. Pulmonary:      Effort: Pulmonary effort is normal. No respiratory distress. Breath sounds: Normal breath sounds. No wheezing or rales. Abdominal:      General: Bowel sounds are normal. There is no distension. Palpations: Abdomen is soft. Tenderness: There is no abdominal tenderness. Musculoskeletal:         General: Tenderness present.  No deformity (No gross deformities of upper or lower extremities) or signs of injury. Normal range of motion. Cervical back: Normal range of motion and neck supple. No muscular tenderness. Right lower leg: No edema. Left lower leg: No edema. Comments: Tenderness on the lateral aspect of his left elbow consistent with tennis elbow   Lymphadenopathy:      Cervical: No cervical adenopathy. Skin:     General: Skin is warm and dry. Findings: No erythema or rash. Neurological:      Mental Status: He is alert and oriented to person, place, and time. Cranial Nerves: No cranial nerve deficit. Psychiatric:         Mood and Affect: Mood normal.         Behavior: Behavior normal.         Thought Content:  Thought content normal.         Data Reviewed and Summarized       Labs:     Imaging/Testing:        Quinn Seaman MD

## 2022-12-19 NOTE — TELEPHONE ENCOUNTER
From: Anibal Avery  To: Dr. Radha Sarabia: 11/22/2022 11:01 AM CST  Subject: ADHD Evaluation     Wondering if this is what you need from [facility] this is all they gave me

## 2022-12-23 ASSESSMENT — ENCOUNTER SYMPTOMS
SHORTNESS OF BREATH: 0
VOMITING: 0
BACK PAIN: 0
RHINORRHEA: 0
CONSTIPATION: 0
DIARRHEA: 0
COUGH: 0
ABDOMINAL PAIN: 0
NAUSEA: 0

## 2023-01-09 DIAGNOSIS — F90.2 ATTENTION DEFICIT HYPERACTIVITY DISORDER (ADHD), COMBINED TYPE: ICD-10-CM

## 2023-01-09 RX ORDER — DEXTROAMPHETAMINE SACCHARATE, AMPHETAMINE ASPARTATE MONOHYDRATE, DEXTROAMPHETAMINE SULFATE AND AMPHETAMINE SULFATE 5; 5; 5; 5 MG/1; MG/1; MG/1; MG/1
20 CAPSULE, EXTENDED RELEASE ORAL EVERY MORNING
Qty: 30 CAPSULE | Refills: 0 | Status: SHIPPED | OUTPATIENT
Start: 2023-01-09 | End: 2024-01-09

## 2023-01-09 RX ORDER — DEXTROAMPHETAMINE SACCHARATE, AMPHETAMINE ASPARTATE MONOHYDRATE, DEXTROAMPHETAMINE SULFATE AND AMPHETAMINE SULFATE 5; 5; 5; 5 MG/1; MG/1; MG/1; MG/1
20 CAPSULE, EXTENDED RELEASE ORAL EVERY MORNING
Qty: 30 CAPSULE | Refills: 0 | Status: SHIPPED | OUTPATIENT
Start: 2023-02-02 | End: 2024-02-02

## 2023-01-09 NOTE — TELEPHONE ENCOUNTER
Patient called in stating that [facility] would not accept him as a new patient to get medications filled with them. Called and verified, [facility] is not accepting controlled substances only patients at this time. Patient did not fill scripts, requested they be canceled and the pharmacist confirmed she will get them cancelled. Last OV 1/5/2023  Next OV Visit date not found      Requested Prescriptions     Pending Prescriptions Disp Refills    amphetamine-dextroamphetamine (ADDERALL XR) 20 MG extended release capsule 30 capsule 0     Sig: Take 1 capsule by mouth every morning. amphetamine-dextroamphetamine (ADDERALL XR) 20 MG extended release capsule 30 capsule 0     Sig: Take 1 capsule by mouth every morning.  Max Daily Amount: 20 mg

## 2023-01-10 RX ORDER — DEXTROAMPHETAMINE SACCHARATE, AMPHETAMINE ASPARTATE MONOHYDRATE, DEXTROAMPHETAMINE SULFATE AND AMPHETAMINE SULFATE 5; 5; 5; 5 MG/1; MG/1; MG/1; MG/1
CAPSULE, EXTENDED RELEASE ORAL
Qty: 30 CAPSULE | Refills: 0 | OUTPATIENT
Start: 2023-01-10

## 2023-02-09 DIAGNOSIS — F90.2 ATTENTION DEFICIT HYPERACTIVITY DISORDER (ADHD), COMBINED TYPE: ICD-10-CM

## 2023-02-09 RX ORDER — DEXTROAMPHETAMINE SACCHARATE, AMPHETAMINE ASPARTATE MONOHYDRATE, DEXTROAMPHETAMINE SULFATE AND AMPHETAMINE SULFATE 5; 5; 5; 5 MG/1; MG/1; MG/1; MG/1
20 CAPSULE, EXTENDED RELEASE ORAL EVERY MORNING
Qty: 30 CAPSULE | Refills: 0 | Status: SHIPPED | OUTPATIENT
Start: 2023-02-09 | End: 2024-02-09

## 2023-02-09 NOTE — TELEPHONE ENCOUNTER
Patient states [facility] does not have his med in stock, wants sent to [address] [place], [place] [address]      [facility] cancelled feb script. Patient aware he is to follow up in March for further refills     Last OV 1/5/2023  Next OV Visit date not found      Requested Prescriptions     Pending Prescriptions Disp Refills    amphetamine-dextroamphetamine (ADDERALL XR) 20 MG extended release capsule 30 capsule 0     Sig: Take 1 capsule by mouth every morning.  Max Daily Amount: 20 mg

## 2023-03-08 ENCOUNTER — OFFICE VISIT (OUTPATIENT)
Dept: FAMILY MEDICINE CLINIC | Age: 33
End: 2023-03-08
Payer: COMMERCIAL

## 2023-03-08 VITALS
HEART RATE: 93 BPM | TEMPERATURE: 97.7 F | WEIGHT: 206 LBS | BODY MASS INDEX: 26.44 KG/M2 | OXYGEN SATURATION: 97 % | DIASTOLIC BLOOD PRESSURE: 56 MMHG | SYSTOLIC BLOOD PRESSURE: 116 MMHG | RESPIRATION RATE: 16 BRPM | HEIGHT: 74 IN

## 2023-03-08 DIAGNOSIS — F90.2 ATTENTION DEFICIT HYPERACTIVITY DISORDER (ADHD), COMBINED TYPE: ICD-10-CM

## 2023-03-08 DIAGNOSIS — E65 ABDOMINAL PANNUS: ICD-10-CM

## 2023-03-08 DIAGNOSIS — F90.2 ATTENTION DEFICIT HYPERACTIVITY DISORDER (ADHD), COMBINED TYPE: Primary | ICD-10-CM

## 2023-03-08 PROCEDURE — 3074F SYST BP LT 130 MM HG: CPT | Performed by: FAMILY MEDICINE

## 2023-03-08 PROCEDURE — 99214 OFFICE O/P EST MOD 30 MIN: CPT | Performed by: FAMILY MEDICINE

## 2023-03-08 PROCEDURE — 3078F DIAST BP <80 MM HG: CPT | Performed by: FAMILY MEDICINE

## 2023-03-08 RX ORDER — DEXTROAMPHETAMINE SACCHARATE, AMPHETAMINE ASPARTATE MONOHYDRATE, DEXTROAMPHETAMINE SULFATE AND AMPHETAMINE SULFATE 7.5; 7.5; 7.5; 7.5 MG/1; MG/1; MG/1; MG/1
30 CAPSULE, EXTENDED RELEASE ORAL DAILY
Qty: 30 CAPSULE | Refills: 0 | Status: SHIPPED | OUTPATIENT
Start: 2023-04-05 | End: 2024-04-04

## 2023-03-08 RX ORDER — DEXTROAMPHETAMINE SACCHARATE, AMPHETAMINE ASPARTATE MONOHYDRATE, DEXTROAMPHETAMINE SULFATE AND AMPHETAMINE SULFATE 7.5; 7.5; 7.5; 7.5 MG/1; MG/1; MG/1; MG/1
30 CAPSULE, EXTENDED RELEASE ORAL EVERY MORNING
Qty: 30 CAPSULE | Refills: 0 | Status: SHIPPED | OUTPATIENT
Start: 2023-03-08 | End: 2024-03-07

## 2023-03-08 RX ORDER — DEXTROAMPHETAMINE SACCHARATE, AMPHETAMINE ASPARTATE MONOHYDRATE, DEXTROAMPHETAMINE SULFATE AND AMPHETAMINE SULFATE 7.5; 7.5; 7.5; 7.5 MG/1; MG/1; MG/1; MG/1
30 CAPSULE, EXTENDED RELEASE ORAL EVERY MORNING
Qty: 30 CAPSULE | Refills: 0 | Status: SHIPPED | OUTPATIENT
Start: 2023-03-08 | End: 2023-03-08 | Stop reason: SDUPTHER

## 2023-03-08 RX ORDER — DEXTROAMPHETAMINE SACCHARATE, AMPHETAMINE ASPARTATE MONOHYDRATE, DEXTROAMPHETAMINE SULFATE AND AMPHETAMINE SULFATE 7.5; 7.5; 7.5; 7.5 MG/1; MG/1; MG/1; MG/1
30 CAPSULE, EXTENDED RELEASE ORAL DAILY
Qty: 30 CAPSULE | Refills: 0 | Status: SHIPPED | OUTPATIENT
Start: 2023-04-05 | End: 2023-03-08 | Stop reason: SDUPTHER

## 2023-03-08 ASSESSMENT — PATIENT HEALTH QUESTIONNAIRE - PHQ9
SUM OF ALL RESPONSES TO PHQ QUESTIONS 1-9: 0
1. LITTLE INTEREST OR PLEASURE IN DOING THINGS: 0
SUM OF ALL RESPONSES TO PHQ QUESTIONS 1-9: 0
SUM OF ALL RESPONSES TO PHQ9 QUESTIONS 1 & 2: 0
2. FEELING DOWN, DEPRESSED OR HOPELESS: 0

## 2023-03-08 ASSESSMENT — ENCOUNTER SYMPTOMS
RHINORRHEA: 0
VOMITING: 0
COUGH: 0
DIARRHEA: 0
BACK PAIN: 0
CONSTIPATION: 0
SHORTNESS OF BREATH: 0
ABDOMINAL PAIN: 0
NAUSEA: 0

## 2023-03-08 NOTE — PROGRESS NOTES
John Paul CALHOUN [address]  Dept: [phone]  Dept Fax: [phone]    Visit type: Established patient    Reason for Visit: Medication Refill         Assessment and Plan       1. Attention deficit hyperactivity disorder (ADHD), combined type  -     amphetamine-dextroamphetamine (ADDERALL XR) 30 MG extended release capsule; Take 1 capsule by mouth every morning. Max Daily Amount: 30 mg, Disp-30 capsule, R-0Normal  -     amphetamine-dextroamphetamine (ADDERALL XR) 30 MG extended release capsule; Take 1 capsule by mouth daily. Max Daily Amount: 30 mg, Disp-30 capsule, R-0Normal  2. Abdominal pannus  -     External Referral To General Surgery    With the pannus and the issues that he has been having with sores discussed the possibility of getting him set up with surgery for evaluation on management of his excess skin in efforts to improve his skin issues that are associated with the excess skin folds that are leading to infections and sores. Discussed proper care of skin. With patient having issues with the Adderall wearing also early discussed possibility of adjustment of the dose of Adderall and we will continue at this time with a prescription into the pharmacy for this month and a prescription for next month and assuming no issues arise we will plan on follow-up in 2 months for continued monitoring and management of his ADHD. Discussed that if this does not do well for him or he has any problems that I would not recommend him waiting 2 months instead coming in before he got his second refill or even earlier for further discussion and adjustments of his medication. Discussed lifestyle modifications that may be beneficial for his symptoms. Discussed signs symptoms requiring medical attention. All questions were answered and patient voiced understanding and agreement with plan as discussed.       Return in about 2 months (around 5/8/2023), or if symptoms worsen or fail to improve.       Subjective       HPI   Patient has a history of ADHD and is doing well with his current dose of  Adderall but is seems to be wearing off before lunch time.  he reports that the medicine allows him to stay focused and complete his tasks until it wears off.  Without the medication he has more problems remaining focused and doing the things that he needs to do. he denies any issues with the medication and reports that he continues to have a normal appetite and denies any changes with his sleep with the use of medicine.    Does have a chronic problem with a pannus that has been ongoing for years with sores and issues and due to that he has reached the issues that he is needing to do something about the excess skin and the issues that he has been having the excess skin.  He is trying to keep the area clean and is diligent with it however it has not prevented some of his skin issues that he has been having.    Review of Systems   Constitutional:  Negative for activity change, appetite change, fatigue and fever.   HENT:  Negative for congestion and rhinorrhea.    Respiratory:  Negative for cough and shortness of breath.    Cardiovascular:  Negative for chest pain and palpitations.   Gastrointestinal:  Negative for abdominal pain, constipation, diarrhea, nausea and vomiting.   Genitourinary:  Negative for dysuria and hematuria.   Musculoskeletal:  Positive for arthralgias. Negative for back pain, gait problem and neck pain.   Skin:  Positive for rash. Negative for wound.   Neurological:  Negative for syncope and weakness.   Hematological:  Negative for adenopathy. Does not bruise/bleed easily.   Psychiatric/Behavioral:  Negative for dysphoric mood and sleep disturbance. The patient is not nervous/anxious.       No Known Allergies    Outpatient Medications Prior to Visit   Medication Sig Dispense Refill    amphetamine-dextroamphetamine (ADDERALL XR) 20 MG extended release capsule Take 1 capsule by mouth every  morning. Max Daily Amount: 20 mg 30 capsule 0    amphetamine-dextroamphetamine (ADDERALL XR) 20 MG extended release capsule Take 1 capsule by mouth every morning. 30 capsule 0    diclofenac (VOLTAREN) 75 MG EC tablet Take 1 tablet by mouth 2 times daily 60 tablet 3    vitamin D (ERGOCALCIFEROL) 1.25 MG (34959 UT) CAPS capsule Take 1 capsule by mouth once a week 12 capsule 1    albuterol (PROVENTIL) (2.5 MG/3ML) 0.083% nebulizer solution Take 3 mLs by nebulization every 6 hours as needed for Wheezing 120 each 3    albuterol sulfate HFA (PROVENTIL HFA) 108 (90 Base) MCG/ACT inhaler Inhale 2 puffs into the lungs every 6 hours as needed for Wheezing 18 g 3     No facility-administered medications prior to visit. Past Medical History:   Diagnosis Date    BiPAP (biphasic positive airway pressure) dependence     16cm/12cm    GERD (gastroesophageal reflux disease)     Hypertension     Obstructive sleep apnea     AHI:  24.3    Restless leg syndrome         Social History     Tobacco Use    Smoking status: Never    Smokeless tobacco: Current     Types: Chew   Substance Use Topics    Alcohol use: Yes     Alcohol/week: 0.0 standard drinks     Comment: rarely        Past Surgical History:   Procedure Laterality Date    CYST REMOVAL      lower back    HAND SURGERY      5 total surgeries    SLEEVE GASTRECTOMY  03/2017       Family History   Problem Relation Age of Onset    Thyroid Disease Mother     High Blood Pressure Mother     High Blood Pressure Father     Cancer Father     Thyroid Disease Sister        Objective       BP (!) 116/56 (Site: Left Upper Arm, Position: Sitting, Cuff Size: Medium Adult)   Pulse 93   Temp 97.7 °F (36.5 °C) (Infrared)   Resp 16   Ht 6' 2\" (1.88 m)   Wt 206 lb (93.4 kg)   SpO2 97%   BMI 26.45 kg/m²   Physical Exam  Vitals and nursing note reviewed. Constitutional:       General: He is not in acute distress. Appearance: Normal appearance. He is well-developed. He is not diaphoretic. HENT:      Head: Normocephalic and atraumatic. Right Ear: External ear normal.      Left Ear: External ear normal.      Nose: Nose normal.      Mouth/Throat:      Mouth: Mucous membranes are moist.      Pharynx: Oropharynx is clear. No oropharyngeal exudate. Eyes:      General: No scleral icterus. Right eye: No discharge. Left eye: No discharge. Conjunctiva/sclera: Conjunctivae normal.   Neck:      Trachea: No tracheal deviation. Cardiovascular:      Rate and Rhythm: Normal rate and regular rhythm. Heart sounds: Normal heart sounds. No murmur heard. No friction rub. No gallop. Pulmonary:      Effort: Pulmonary effort is normal. No respiratory distress. Breath sounds: Normal breath sounds. No wheezing or rales. Abdominal:      General: Bowel sounds are normal. There is no distension. Palpations: Abdomen is soft. Tenderness: There is no abdominal tenderness. Musculoskeletal:         General: No deformity (No gross deformities of upper or lower extremities) or signs of injury. Normal range of motion. Cervical back: Normal range of motion and neck supple. No muscular tenderness. Right lower leg: No edema. Left lower leg: No edema. Lymphadenopathy:      Cervical: No cervical adenopathy. Skin:     General: Skin is warm and dry. Findings: Rash present. No erythema. Comments: Under patient's pannus there are areas of scars from previous sores and abscesses. There is still a small sore present that does not appear to be infected at this time with some rash and skin color changes noted on exam.   Neurological:      Mental Status: He is alert and oriented to person, place, and time. Cranial Nerves: No cranial nerve deficit. Psychiatric:         Mood and Affect: Mood normal.         Behavior: Behavior normal.         Thought Content:  Thought content normal.         Data Reviewed and Summarized       Labs: Imaging/Testing:        Tod Cheema MD

## 2023-03-08 NOTE — TELEPHONE ENCOUNTER
Patient called stating [facility] in [place] [address] did not have med in stock, unsure of when it will be available. Patient would like med sent to [facility], meds cancelled at [facility], spoke to Eva and she confirmed cancellation for March and April scripts     Last OV 3/8/2023  Next OV Visit date not found      Requested Prescriptions     Pending Prescriptions Disp Refills    amphetamine-dextroamphetamine (ADDERALL XR) 30 MG extended release capsule 30 capsule 0     Sig: Take 1 capsule by mouth every morning. Max Daily Amount: 30 mg    amphetamine-dextroamphetamine (ADDERALL XR) 30 MG extended release capsule 30 capsule 0     Sig: Take 1 capsule by mouth daily.  Max Daily Amount: 30 mg

## 2023-05-25 ENCOUNTER — OFFICE VISIT (OUTPATIENT)
Dept: FAMILY MEDICINE CLINIC | Age: 33
End: 2023-05-25

## 2023-05-25 VITALS
WEIGHT: 197.13 LBS | HEART RATE: 79 BPM | DIASTOLIC BLOOD PRESSURE: 70 MMHG | HEIGHT: 74 IN | OXYGEN SATURATION: 98 % | SYSTOLIC BLOOD PRESSURE: 122 MMHG | TEMPERATURE: 99 F | BODY MASS INDEX: 25.3 KG/M2

## 2023-05-25 DIAGNOSIS — F90.2 ATTENTION DEFICIT HYPERACTIVITY DISORDER (ADHD), COMBINED TYPE: ICD-10-CM

## 2023-05-25 PROCEDURE — 3074F SYST BP LT 130 MM HG: CPT | Performed by: FAMILY MEDICINE

## 2023-05-25 PROCEDURE — 3078F DIAST BP <80 MM HG: CPT | Performed by: FAMILY MEDICINE

## 2023-05-25 PROCEDURE — 99213 OFFICE O/P EST LOW 20 MIN: CPT | Performed by: FAMILY MEDICINE

## 2023-05-25 RX ORDER — DEXTROAMPHETAMINE SACCHARATE, AMPHETAMINE ASPARTATE MONOHYDRATE, DEXTROAMPHETAMINE SULFATE AND AMPHETAMINE SULFATE 7.5; 7.5; 7.5; 7.5 MG/1; MG/1; MG/1; MG/1
30 CAPSULE, EXTENDED RELEASE ORAL DAILY
Qty: 30 CAPSULE | Refills: 0 | Status: SHIPPED | OUTPATIENT
Start: 2023-05-25 | End: 2024-05-24

## 2023-05-25 RX ORDER — DEXTROAMPHETAMINE SACCHARATE, AMPHETAMINE ASPARTATE MONOHYDRATE, DEXTROAMPHETAMINE SULFATE AND AMPHETAMINE SULFATE 7.5; 7.5; 7.5; 7.5 MG/1; MG/1; MG/1; MG/1
30 CAPSULE, EXTENDED RELEASE ORAL EVERY MORNING
Qty: 30 CAPSULE | Refills: 0 | Status: SHIPPED | OUTPATIENT
Start: 2023-06-23 | End: 2024-06-22

## 2023-05-25 SDOH — ECONOMIC STABILITY: FOOD INSECURITY: WITHIN THE PAST 12 MONTHS, THE FOOD YOU BOUGHT JUST DIDN'T LAST AND YOU DIDN'T HAVE MONEY TO GET MORE.: NEVER TRUE

## 2023-05-25 SDOH — ECONOMIC STABILITY: HOUSING INSECURITY
IN THE LAST 12 MONTHS, WAS THERE A TIME WHEN YOU DID NOT HAVE A STEADY PLACE TO SLEEP OR SLEPT IN A SHELTER (INCLUDING NOW)?: NO

## 2023-05-25 SDOH — ECONOMIC STABILITY: FOOD INSECURITY: WITHIN THE PAST 12 MONTHS, YOU WORRIED THAT YOUR FOOD WOULD RUN OUT BEFORE YOU GOT MONEY TO BUY MORE.: NEVER TRUE

## 2023-05-25 SDOH — ECONOMIC STABILITY: INCOME INSECURITY: HOW HARD IS IT FOR YOU TO PAY FOR THE VERY BASICS LIKE FOOD, HOUSING, MEDICAL CARE, AND HEATING?: NOT HARD AT ALL

## 2023-05-25 ASSESSMENT — ENCOUNTER SYMPTOMS
CONSTIPATION: 0
VOMITING: 0
RHINORRHEA: 0
COUGH: 0
SHORTNESS OF BREATH: 0
BACK PAIN: 0
ABDOMINAL PAIN: 0
NAUSEA: 0
DIARRHEA: 0

## 2023-05-25 NOTE — PROGRESS NOTES
motion. Cervical back: Normal range of motion and neck supple. No muscular tenderness. Right lower leg: No edema. Left lower leg: No edema. Lymphadenopathy:      Cervical: No cervical adenopathy. Skin:     General: Skin is warm and dry. Findings: No erythema or rash. Neurological:      Mental Status: He is alert and oriented to person, place, and time. Cranial Nerves: No cranial nerve deficit. Psychiatric:         Mood and Affect: Mood normal.         Behavior: Behavior normal.         Thought Content:  Thought content normal.         Data Reviewed and Summarized       Labs:     Imaging/Testing:        Dane Palacios MD

## 2023-07-25 ENCOUNTER — OFFICE VISIT (OUTPATIENT)
Dept: FAMILY MEDICINE CLINIC | Age: 33
End: 2023-07-25

## 2023-07-25 VITALS
HEIGHT: 74 IN | RESPIRATION RATE: 16 BRPM | SYSTOLIC BLOOD PRESSURE: 116 MMHG | BODY MASS INDEX: 25.57 KG/M2 | HEART RATE: 99 BPM | DIASTOLIC BLOOD PRESSURE: 70 MMHG | TEMPERATURE: 98.2 F | OXYGEN SATURATION: 99 % | WEIGHT: 199.2 LBS

## 2023-07-25 DIAGNOSIS — F90.2 ATTENTION DEFICIT HYPERACTIVITY DISORDER (ADHD), COMBINED TYPE: ICD-10-CM

## 2023-07-25 PROCEDURE — 3078F DIAST BP <80 MM HG: CPT | Performed by: FAMILY MEDICINE

## 2023-07-25 PROCEDURE — 3074F SYST BP LT 130 MM HG: CPT | Performed by: FAMILY MEDICINE

## 2023-07-25 PROCEDURE — 99213 OFFICE O/P EST LOW 20 MIN: CPT | Performed by: FAMILY MEDICINE

## 2023-07-25 RX ORDER — DEXTROAMPHETAMINE SACCHARATE, AMPHETAMINE ASPARTATE MONOHYDRATE, DEXTROAMPHETAMINE SULFATE AND AMPHETAMINE SULFATE 7.5; 7.5; 7.5; 7.5 MG/1; MG/1; MG/1; MG/1
30 CAPSULE, EXTENDED RELEASE ORAL DAILY
Qty: 30 CAPSULE | Refills: 0 | Status: SHIPPED | OUTPATIENT
Start: 2023-07-25 | End: 2024-07-24

## 2023-07-25 RX ORDER — DEXTROAMPHETAMINE SACCHARATE, AMPHETAMINE ASPARTATE MONOHYDRATE, DEXTROAMPHETAMINE SULFATE AND AMPHETAMINE SULFATE 7.5; 7.5; 7.5; 7.5 MG/1; MG/1; MG/1; MG/1
30 CAPSULE, EXTENDED RELEASE ORAL EVERY MORNING
Qty: 30 CAPSULE | Refills: 0 | Status: SHIPPED | OUTPATIENT
Start: 2023-08-23 | End: 2024-08-22

## 2023-07-25 NOTE — PROGRESS NOTES
Megan VALENTINKOSTAS [facility]  [address] [facility]  Dept: [phone]  Dept Fax: [phone]    Visit type: Established patient    Reason for Visit: Medication Refill         Assessment and Plan       1. Attention deficit hyperactivity disorder (ADHD), combined type  -     amphetamine-dextroamphetamine (ADDERALL XR) 30 MG extended release capsule; Take 1 capsule by mouth every morning. Max Daily Amount: 30 mg, Disp-30 capsule, R-0Normal  -     amphetamine-dextroamphetamine (ADDERALL XR) 30 MG extended release capsule; Take 1 capsule by mouth daily. Max Daily Amount: 30 mg, Disp-30 capsule, R-0Normal    With patient doing well with his current use of Adderall we will continue at this time with a prescription into the pharmacy for this month and a prescription for next month and assuming no issues arise we will plan on follow-up in 2 months for continued monitoring and management of his ADHD. Discussed lifestyle modifications that may be beneficial for his symptoms. Discussed signs symptoms requiring medical attention. All questions were answered and patient voiced understanding and agreement with plan as discussed. Return in about 2 months (around 9/25/2023), or if symptoms worsen or fail to improve. Subjective       HPI   Patient has a history of ADHD and is doing well with his current dose of  Adderall. he reports that the medicine allows him to stay focused and complete his tasks. Without the medication he has more problems remaining focused and doing the things that he needs to do. he denies any issues with the medication and reports that he continues to have a normal appetite and denies any changes with his sleep with the use of medicine. Review of Systems   Constitutional:  Negative for activity change, appetite change, fatigue and fever. HENT:  Negative for congestion and rhinorrhea. Respiratory:  Negative for cough and shortness of breath.     Cardiovascular:  Negative

## 2023-08-06 ASSESSMENT — ENCOUNTER SYMPTOMS
DIARRHEA: 0
BACK PAIN: 0
COUGH: 0
RHINORRHEA: 0
NAUSEA: 0
VOMITING: 0
SHORTNESS OF BREATH: 0
ABDOMINAL PAIN: 0
CONSTIPATION: 0

## 2023-09-14 ENCOUNTER — TELEMEDICINE (OUTPATIENT)
Dept: FAMILY MEDICINE CLINIC | Age: 33
End: 2023-09-14

## 2023-09-14 DIAGNOSIS — F90.2 ATTENTION DEFICIT HYPERACTIVITY DISORDER (ADHD), COMBINED TYPE: ICD-10-CM

## 2023-09-14 PROCEDURE — 99213 OFFICE O/P EST LOW 20 MIN: CPT | Performed by: FAMILY MEDICINE

## 2023-09-14 RX ORDER — DEXTROAMPHETAMINE SACCHARATE, AMPHETAMINE ASPARTATE MONOHYDRATE, DEXTROAMPHETAMINE SULFATE AND AMPHETAMINE SULFATE 7.5; 7.5; 7.5; 7.5 MG/1; MG/1; MG/1; MG/1
30 CAPSULE, EXTENDED RELEASE ORAL DAILY
Qty: 30 CAPSULE | Refills: 0 | Status: SHIPPED | OUTPATIENT
Start: 2023-09-14 | End: 2024-09-13

## 2023-09-14 RX ORDER — DEXTROAMPHETAMINE SACCHARATE, AMPHETAMINE ASPARTATE MONOHYDRATE, DEXTROAMPHETAMINE SULFATE AND AMPHETAMINE SULFATE 7.5; 7.5; 7.5; 7.5 MG/1; MG/1; MG/1; MG/1
30 CAPSULE, EXTENDED RELEASE ORAL EVERY MORNING
Qty: 30 CAPSULE | Refills: 0 | Status: SHIPPED | OUTPATIENT
Start: 2023-10-12 | End: 2024-10-11

## 2023-09-14 ASSESSMENT — ENCOUNTER SYMPTOMS
COUGH: 0
RHINORRHEA: 0
SHORTNESS OF BREATH: 0
VOMITING: 0
ABDOMINAL PAIN: 0
NAUSEA: 0
DIARRHEA: 0
BACK PAIN: 0
CONSTIPATION: 0

## 2023-09-14 NOTE — PROGRESS NOTES
Pulmonary/Chest: [x] Respiratory effort normal   [x] No visualized signs of difficulty breathing or respiratory distress        [] Abnormal -      Musculoskeletal:           [x] Normal range of motion of neck        [] Abnormal -     Neurological:        [x] No Facial Asymmetry (Cranial nerve 7 motor function) (limited exam due to video visit)          [x] No gaze palsy        [] Abnormal -          Skin:        [x] No significant exanthematous lesions or discoloration noted on facial skin         [] Abnormal -            Psychiatric:       [x] Normal Affect [] Abnormal -        [x] No Hallucinations    Other pertinent observable physical exam findings:-             --Jacek Moore MD

## 2023-10-04 DIAGNOSIS — J45.20 MILD INTERMITTENT ASTHMA, UNSPECIFIED WHETHER COMPLICATED: ICD-10-CM

## 2023-10-04 NOTE — TELEPHONE ENCOUNTER
Hank Gilberto called to request a refill on his medication.       Last office visit : 9/14/2023   Next office visit : Visit date not found     Requested Prescriptions     Pending Prescriptions Disp Refills    albuterol sulfate HFA (PROVENTIL HFA) 108 (90 Base) MCG/ACT inhaler 18 g 3     Sig: Inhale 2 puffs into the lungs every 6 hours as needed for Wheezing

## 2023-10-06 RX ORDER — ALBUTEROL SULFATE 90 UG/1
2 AEROSOL, METERED RESPIRATORY (INHALATION) EVERY 6 HOURS PRN
Qty: 18 G | Refills: 3 | Status: SHIPPED | OUTPATIENT
Start: 2023-10-06

## 2023-11-08 ENCOUNTER — OFFICE VISIT (OUTPATIENT)
Dept: FAMILY MEDICINE CLINIC | Age: 33
End: 2023-11-08

## 2023-11-08 VITALS
BODY MASS INDEX: 25.46 KG/M2 | RESPIRATION RATE: 18 BRPM | SYSTOLIC BLOOD PRESSURE: 132 MMHG | OXYGEN SATURATION: 97 % | HEIGHT: 74 IN | HEART RATE: 96 BPM | WEIGHT: 198.4 LBS | DIASTOLIC BLOOD PRESSURE: 74 MMHG

## 2023-11-08 DIAGNOSIS — F90.2 ATTENTION DEFICIT HYPERACTIVITY DISORDER (ADHD), COMBINED TYPE: Primary | ICD-10-CM

## 2023-11-08 DIAGNOSIS — J45.20 MILD INTERMITTENT ASTHMA, UNSPECIFIED WHETHER COMPLICATED: ICD-10-CM

## 2023-11-08 PROCEDURE — 99214 OFFICE O/P EST MOD 30 MIN: CPT | Performed by: FAMILY MEDICINE

## 2023-11-08 PROCEDURE — 3078F DIAST BP <80 MM HG: CPT | Performed by: FAMILY MEDICINE

## 2023-11-08 PROCEDURE — 3075F SYST BP GE 130 - 139MM HG: CPT | Performed by: FAMILY MEDICINE

## 2023-11-08 RX ORDER — DEXTROAMPHETAMINE SACCHARATE, AMPHETAMINE ASPARTATE MONOHYDRATE, DEXTROAMPHETAMINE SULFATE AND AMPHETAMINE SULFATE 7.5; 7.5; 7.5; 7.5 MG/1; MG/1; MG/1; MG/1
30 CAPSULE, EXTENDED RELEASE ORAL EVERY MORNING
Qty: 30 CAPSULE | Refills: 0 | Status: CANCELLED | OUTPATIENT
Start: 2023-11-08 | End: 2024-11-07

## 2023-11-08 RX ORDER — ALBUTEROL SULFATE 90 UG/1
2 AEROSOL, METERED RESPIRATORY (INHALATION) EVERY 6 HOURS PRN
Qty: 18 G | Refills: 5 | Status: SHIPPED | OUTPATIENT
Start: 2023-11-08

## 2023-11-08 RX ORDER — DEXTROAMPHETAMINE SACCHARATE, AMPHETAMINE ASPARTATE MONOHYDRATE, DEXTROAMPHETAMINE SULFATE AND AMPHETAMINE SULFATE 5; 5; 5; 5 MG/1; MG/1; MG/1; MG/1
40 CAPSULE, EXTENDED RELEASE ORAL EVERY MORNING
Qty: 60 CAPSULE | Refills: 0 | Status: SHIPPED | OUTPATIENT
Start: 2023-11-08 | End: 2024-11-07

## 2023-11-08 RX ORDER — DEXTROAMPHETAMINE SACCHARATE, AMPHETAMINE ASPARTATE MONOHYDRATE, DEXTROAMPHETAMINE SULFATE AND AMPHETAMINE SULFATE 5; 5; 5; 5 MG/1; MG/1; MG/1; MG/1
40 CAPSULE, EXTENDED RELEASE ORAL EVERY MORNING
Qty: 60 CAPSULE | Refills: 0 | Status: SHIPPED | OUTPATIENT
Start: 2023-12-06 | End: 2024-12-05

## 2023-11-08 ASSESSMENT — ENCOUNTER SYMPTOMS
VOMITING: 0
NAUSEA: 0
SHORTNESS OF BREATH: 0
ABDOMINAL PAIN: 0
COUGH: 0
RHINORRHEA: 0
DIARRHEA: 0
BACK PAIN: 0
CONSTIPATION: 0

## 2023-11-08 NOTE — PROGRESS NOTES
Rogelio Denaetess VALENTINKOSTAS [facility]  [address] [facility]  Dept: [phone]  Dept Fax: [phone]    Visit type: Established patient    Reason for Visit: Medication Refill         Assessment and Plan       1. Attention deficit hyperactivity disorder (ADHD), combined type  -     amphetamine-dextroamphetamine (ADDERALL XR) 20 MG extended release capsule; Take 2 capsules by mouth every morning. Max Daily Amount: 40 mg, Disp-60 capsule, R-0Normal  -     amphetamine-dextroamphetamine (ADDERALL XR) 20 MG extended release capsule; Take 2 capsules by mouth every morning. Max Daily Amount: 40 mg, Disp-60 capsule, R-0Normal  2. Mild intermittent asthma, unspecified whether complicated  -     albuterol sulfate HFA (PROVENTIL HFA) 108 (90 Base) MCG/ACT inhaler; Inhale 2 puffs into the lungs every 6 hours as needed for Wheezing, Disp-18 g, R-5Normal    With his ADHD doing worse discussed possibility of going up on the Adderall, adding on immediate release, or changing medications. He was initially interested in the idea of Vyvanse but due to the cost is going to continue with Adderall and through shared decision-making termination was to go up on the Adderall extended release with proper use of medication discussed and potential side effects. Discussed lifestyle modifications that may beneficial.  Discussed asthma exacerbation management. Stressed importance maintain follow-up with his specialist.  All questions were answered patient voiced understanding agreement plan as discussed    Return in about 2 months (around 1/8/2024), or if symptoms worsen or fail to improve. Subjective       HPI   Patient is history of ADHD and has previously done well with the use of Adderall XR 30 mg. He states that he has started to have more problems with the medicine wearing off and he is not able to get through his workday with the medicine and staying focused.   He states that he started noticed more issues with ADHD

## 2024-01-03 ENCOUNTER — TELEMEDICINE (OUTPATIENT)
Dept: FAMILY MEDICINE CLINIC | Age: 34
End: 2024-01-03

## 2024-01-03 DIAGNOSIS — F90.2 ATTENTION DEFICIT HYPERACTIVITY DISORDER (ADHD), COMBINED TYPE: ICD-10-CM

## 2024-01-03 PROCEDURE — 99213 OFFICE O/P EST LOW 20 MIN: CPT | Performed by: FAMILY MEDICINE

## 2024-01-03 RX ORDER — DEXTROAMPHETAMINE SACCHARATE, AMPHETAMINE ASPARTATE MONOHYDRATE, DEXTROAMPHETAMINE SULFATE AND AMPHETAMINE SULFATE 5; 5; 5; 5 MG/1; MG/1; MG/1; MG/1
40 CAPSULE, EXTENDED RELEASE ORAL EVERY MORNING
Qty: 60 CAPSULE | Refills: 0 | Status: SHIPPED | OUTPATIENT
Start: 2024-02-02 | End: 2025-02-01

## 2024-01-03 RX ORDER — DEXTROAMPHETAMINE SACCHARATE, AMPHETAMINE ASPARTATE MONOHYDRATE, DEXTROAMPHETAMINE SULFATE AND AMPHETAMINE SULFATE 5; 5; 5; 5 MG/1; MG/1; MG/1; MG/1
40 CAPSULE, EXTENDED RELEASE ORAL EVERY MORNING
Qty: 60 CAPSULE | Refills: 0 | Status: SHIPPED | OUTPATIENT
Start: 2024-01-03 | End: 2025-01-02

## 2024-01-03 ASSESSMENT — ENCOUNTER SYMPTOMS
COUGH: 0
CONSTIPATION: 0
NAUSEA: 0
BACK PAIN: 0
VOMITING: 0
ABDOMINAL PAIN: 0
DIARRHEA: 0
RHINORRHEA: 0
SHORTNESS OF BREATH: 0

## 2024-01-03 NOTE — PROGRESS NOTES
Mele Gonzalez, was evaluated through a synchronous (real-time) audio-video encounter. The patient (or guardian if applicable) is aware that this is a billable service, which includes applicable co-pays. This Virtual Visit was conducted with patient's (and/or legal guardian's) consent. Patient identification was verified, and a caregiver was present when appropriate.   The patient was located at Home: [address] [place] [address] [place] [address]  Provider was located at Facility (Appt Dept): [address] [place] [id number]      Mele Gonzalez (:  1990) is a Established patient, presenting virtually for evaluation of the following:    Assessment & Plan   Below is the assessment and plan developed based on review of pertinent history, physical exam, labs, studies, and medications.  1. Attention deficit hyperactivity disorder (ADHD), combined type  -     amphetamine-dextroamphetamine (ADDERALL XR) 20 MG extended release capsule; Take 2 capsules by mouth every morning. Max Daily Amount: 40 mg, Disp-60 capsule, R-0Normal  -     amphetamine-dextroamphetamine (ADDERALL XR) 20 MG extended release capsule; Take 2 capsules by mouth every morning. Max Daily Amount: 40 mg, Disp-60 capsule, R-0Normal    With patient doing well with his current use of Adderall we will continue at this time with a prescription into the pharmacy for this month and a prescription for next month and assuming no issues arise we will plan on follow-up in 2 months for continued monitoring and management of his ADHD.  Discussed lifestyle modifications that may be beneficial for his symptoms.  Discussed signs symptoms requiring medical attention.  All questions were answered and patient voiced understanding and agreement with plan as discussed.      Return in about 2 months (around 3/3/2024), or if symptoms worsen or fail to improve.       Subjective   HPI  Patient has a history of ADHD and is doing well with his current dose of

## 2024-03-04 ASSESSMENT — PATIENT HEALTH QUESTIONNAIRE - PHQ9
2. FEELING DOWN, DEPRESSED OR HOPELESS: 0
SUM OF ALL RESPONSES TO PHQ9 QUESTIONS 1 & 2: 0
SUM OF ALL RESPONSES TO PHQ QUESTIONS 1-9: 0
SUM OF ALL RESPONSES TO PHQ QUESTIONS 1-9: 0
1. LITTLE INTEREST OR PLEASURE IN DOING THINGS: 0
SUM OF ALL RESPONSES TO PHQ QUESTIONS 1-9: 0
2. FEELING DOWN, DEPRESSED OR HOPELESS: NOT AT ALL
1. LITTLE INTEREST OR PLEASURE IN DOING THINGS: NOT AT ALL
SUM OF ALL RESPONSES TO PHQ QUESTIONS 1-9: 0
SUM OF ALL RESPONSES TO PHQ9 QUESTIONS 1 & 2: 0

## 2024-03-06 ENCOUNTER — OFFICE VISIT (OUTPATIENT)
Dept: FAMILY MEDICINE CLINIC | Age: 34
End: 2024-03-06

## 2024-03-06 VITALS
WEIGHT: 198 LBS | HEART RATE: 80 BPM | HEIGHT: 74 IN | SYSTOLIC BLOOD PRESSURE: 128 MMHG | DIASTOLIC BLOOD PRESSURE: 68 MMHG | BODY MASS INDEX: 25.41 KG/M2 | OXYGEN SATURATION: 99 % | TEMPERATURE: 100 F

## 2024-03-06 DIAGNOSIS — F90.2 ATTENTION DEFICIT HYPERACTIVITY DISORDER (ADHD), COMBINED TYPE: Primary | ICD-10-CM

## 2024-03-06 PROCEDURE — 3074F SYST BP LT 130 MM HG: CPT | Performed by: FAMILY MEDICINE

## 2024-03-06 PROCEDURE — 3078F DIAST BP <80 MM HG: CPT | Performed by: FAMILY MEDICINE

## 2024-03-06 PROCEDURE — 99214 OFFICE O/P EST MOD 30 MIN: CPT | Performed by: FAMILY MEDICINE

## 2024-03-06 RX ORDER — METHYLPHENIDATE HYDROCHLORIDE 36 MG/1
36 TABLET ORAL DAILY
Qty: 30 TABLET | Refills: 0 | Status: SHIPPED | OUTPATIENT
Start: 2024-03-06 | End: 2024-04-05

## 2024-03-06 NOTE — PROGRESS NOTES
not diaphoretic.   HENT:      Head: Normocephalic and atraumatic.      Right Ear: External ear normal.      Left Ear: External ear normal.      Nose: Nose normal.      Mouth/Throat:      Mouth: Mucous membranes are moist.      Pharynx: Oropharynx is clear. No oropharyngeal exudate.   Eyes:      General: No scleral icterus.        Right eye: No discharge.         Left eye: No discharge.      Conjunctiva/sclera: Conjunctivae normal.   Neck:      Trachea: No tracheal deviation.   Cardiovascular:      Rate and Rhythm: Normal rate and regular rhythm.      Heart sounds: Normal heart sounds. No murmur heard.     No friction rub. No gallop.   Pulmonary:      Effort: Pulmonary effort is normal. No respiratory distress.      Breath sounds: Normal breath sounds. No wheezing or rales.   Abdominal:      General: Bowel sounds are normal. There is no distension.      Palpations: Abdomen is soft.      Tenderness: There is no abdominal tenderness.   Musculoskeletal:         General: No deformity (No gross deformities of upper or lower extremities) or signs of injury. Normal range of motion.      Cervical back: Normal range of motion and neck supple. No muscular tenderness.      Right lower leg: No edema.      Left lower leg: No edema.   Lymphadenopathy:      Cervical: No cervical adenopathy.   Skin:     General: Skin is warm and dry.      Findings: No erythema or rash.   Neurological:      Mental Status: He is alert and oriented to person, place, and time.      Cranial Nerves: No cranial nerve deficit.   Psychiatric:         Mood and Affect: Mood normal.         Behavior: Behavior normal.         Thought Content: Thought content normal.           Data Reviewed and Summarized       Labs:     Imaging/Testing:        VENKATESH NAIR MD

## 2024-03-16 ASSESSMENT — ENCOUNTER SYMPTOMS
COUGH: 0
ABDOMINAL PAIN: 0
DIARRHEA: 0
CONSTIPATION: 0
SHORTNESS OF BREATH: 0
VOMITING: 0
NAUSEA: 0
RHINORRHEA: 0
BACK PAIN: 0

## 2024-03-27 ENCOUNTER — OFFICE VISIT (OUTPATIENT)
Dept: FAMILY MEDICINE CLINIC | Age: 34
End: 2024-03-27

## 2024-03-27 VITALS
HEIGHT: 74 IN | SYSTOLIC BLOOD PRESSURE: 124 MMHG | TEMPERATURE: 99.1 F | OXYGEN SATURATION: 100 % | BODY MASS INDEX: 26.28 KG/M2 | WEIGHT: 204.8 LBS | DIASTOLIC BLOOD PRESSURE: 62 MMHG | HEART RATE: 67 BPM

## 2024-03-27 DIAGNOSIS — F90.2 ATTENTION DEFICIT HYPERACTIVITY DISORDER (ADHD), COMBINED TYPE: ICD-10-CM

## 2024-03-27 DIAGNOSIS — F90.2 ATTENTION DEFICIT HYPERACTIVITY DISORDER (ADHD), COMBINED TYPE: Primary | ICD-10-CM

## 2024-03-27 PROCEDURE — 3074F SYST BP LT 130 MM HG: CPT | Performed by: FAMILY MEDICINE

## 2024-03-27 PROCEDURE — 3078F DIAST BP <80 MM HG: CPT | Performed by: FAMILY MEDICINE

## 2024-03-27 PROCEDURE — 99214 OFFICE O/P EST MOD 30 MIN: CPT | Performed by: FAMILY MEDICINE

## 2024-03-27 RX ORDER — DEXTROAMPHETAMINE SACCHARATE, AMPHETAMINE ASPARTATE, DEXTROAMPHETAMINE SULFATE AND AMPHETAMINE SULFATE 5; 5; 5; 5 MG/1; MG/1; MG/1; MG/1
20 TABLET ORAL DAILY
Qty: 30 TABLET | Refills: 0 | Status: SHIPPED | OUTPATIENT
Start: 2024-03-27 | End: 2025-03-27

## 2024-03-27 RX ORDER — DEXTROAMPHETAMINE SACCHARATE, AMPHETAMINE ASPARTATE MONOHYDRATE, DEXTROAMPHETAMINE SULFATE AND AMPHETAMINE SULFATE 5; 5; 5; 5 MG/1; MG/1; MG/1; MG/1
20 CAPSULE, EXTENDED RELEASE ORAL EVERY MORNING
Qty: 30 CAPSULE | Refills: 0 | Status: SHIPPED | OUTPATIENT
Start: 2024-03-27 | End: 2025-03-27

## 2024-03-27 RX ORDER — DEXTROAMPHETAMINE SACCHARATE, AMPHETAMINE ASPARTATE MONOHYDRATE, DEXTROAMPHETAMINE SULFATE AND AMPHETAMINE SULFATE 5; 5; 5; 5 MG/1; MG/1; MG/1; MG/1
40 CAPSULE, EXTENDED RELEASE ORAL EVERY MORNING
Qty: 60 CAPSULE | Refills: 0 | OUTPATIENT
Start: 2024-03-27 | End: 2025-03-27

## 2024-03-27 RX ORDER — DEXTROAMPHETAMINE SACCHARATE, AMPHETAMINE ASPARTATE, DEXTROAMPHETAMINE SULFATE AND AMPHETAMINE SULFATE 5; 5; 5; 5 MG/1; MG/1; MG/1; MG/1
20 TABLET ORAL DAILY
Qty: 30 TABLET | Refills: 0 | Status: SHIPPED | OUTPATIENT
Start: 2024-04-24 | End: 2025-04-24

## 2024-03-27 RX ORDER — DEXTROAMPHETAMINE SACCHARATE, AMPHETAMINE ASPARTATE MONOHYDRATE, DEXTROAMPHETAMINE SULFATE AND AMPHETAMINE SULFATE 5; 5; 5; 5 MG/1; MG/1; MG/1; MG/1
20 CAPSULE, EXTENDED RELEASE ORAL EVERY MORNING
Qty: 30 CAPSULE | Refills: 0 | Status: SHIPPED | OUTPATIENT
Start: 2024-04-24 | End: 2025-04-24

## 2024-03-27 NOTE — PROGRESS NOTES
.   LJ BROOKE [address] [place] [address]  Dept: [phone]  Dept Fax: [phone]    Visit type: Established patient    Reason for Visit: Discuss Medications (Concerta is giving him unbearable headaches.)         Assessment and Plan       1. Attention deficit hyperactivity disorder (ADHD), combined type  -     amphetamine-dextroamphetamine (ADDERALL XR) 20 MG extended release capsule; Take 1 capsule by mouth every morning. Max Daily Amount: 20 mg, Disp-30 capsule, R-0Normal  -     amphetamine-dextroamphetamine (ADDERALL XR) 20 MG extended release capsule; Take 1 capsule by mouth every morning. Max Daily Amount: 20 mg, Disp-30 capsule, R-0Normal  -     amphetamine-dextroamphetamine (ADDERALL, 20MG,) 20 MG tablet; Take 1 tablet by mouth daily. Max Daily Amount: 20 mg, Disp-30 tablet, R-0Normal  -     amphetamine-dextroamphetamine (ADDERALL, 20MG,) 20 MG tablet; Take 1 tablet by mouth daily. Max Daily Amount: 20 mg, Disp-30 tablet, R-0Normal    Discussed management options and with shared decision making determination was to continue with the 20 mg of Adderall XR but adding an immediate release for the afternoon when needed and discussed self dose adjustments on the immediate release dosing and continued monitoring and the potential for further adjustments moving forward.  Discussed lifestyle modifications that may beneficial.  Discussed signs symptoms require medical attention.  All questions were answered patient voiced understanding and agreement with plan as discussed.    Return in about 2 months (around 5/27/2024), or if symptoms worsen or fail to improve.       Subjective       HPI   Adderall wasn't lasting long enough and because of that we ended up attempting concerta but that caused him issues with headaches. He felt like the 20 mg was good. But with the 2nd dose was lasting too long and effecting his ability to calm down.     Review of Systems   Constitutional:  Negative for activity

## 2024-04-04 ASSESSMENT — ENCOUNTER SYMPTOMS
BACK PAIN: 0
COUGH: 0
RHINORRHEA: 0
CONSTIPATION: 0
NAUSEA: 0
ABDOMINAL PAIN: 0
SHORTNESS OF BREATH: 0
VOMITING: 0
DIARRHEA: 0

## 2024-04-23 ENCOUNTER — OFFICE VISIT (OUTPATIENT)
Dept: FAMILY MEDICINE CLINIC | Age: 34
End: 2024-04-23

## 2024-04-23 VITALS
SYSTOLIC BLOOD PRESSURE: 110 MMHG | HEIGHT: 74 IN | DIASTOLIC BLOOD PRESSURE: 64 MMHG | TEMPERATURE: 98.4 F | HEART RATE: 93 BPM | RESPIRATION RATE: 16 BRPM | OXYGEN SATURATION: 98 % | BODY MASS INDEX: 25 KG/M2 | WEIGHT: 194.8 LBS

## 2024-04-23 DIAGNOSIS — M79.602 LEFT ARM PAIN: Primary | ICD-10-CM

## 2024-04-23 DIAGNOSIS — M79.2 NEURALGIA: ICD-10-CM

## 2024-04-23 PROCEDURE — 3074F SYST BP LT 130 MM HG: CPT | Performed by: FAMILY MEDICINE

## 2024-04-23 PROCEDURE — 99213 OFFICE O/P EST LOW 20 MIN: CPT | Performed by: FAMILY MEDICINE

## 2024-04-23 PROCEDURE — 3078F DIAST BP <80 MM HG: CPT | Performed by: FAMILY MEDICINE

## 2024-04-23 RX ORDER — GABAPENTIN 300 MG/1
300 CAPSULE ORAL 3 TIMES DAILY
Qty: 90 CAPSULE | Refills: 0 | Status: SHIPPED | OUTPATIENT
Start: 2024-04-23 | End: 2025-04-23

## 2024-04-23 RX ORDER — PREDNISONE 20 MG/1
TABLET ORAL
Qty: 14 TABLET | Refills: 0 | Status: SHIPPED | OUTPATIENT
Start: 2024-04-23 | End: 2024-05-05

## 2024-04-23 NOTE — PROGRESS NOTES
Michel BROOKE AKUA [address] [place] [address]  Dept: [phone]  Dept Fax: [phone]    Visit type: Established patient    Reason for Visit: Shoulder Pain (left) and Numbness (Left arm)      Assessment & Plan   Assessment and Plan       1. Left arm pain  -     predniSONE (DELTASONE) 20 MG tablet; Take 2 tablets by mouth daily for 4 days, THEN 1 tablet daily for 4 days, THEN 0.5 tablets daily for 4 days., Disp-14 tablet, R-0Normal  -     gabapentin (NEURONTIN) 300 MG capsule; Take 1 capsule by mouth 3 times daily. Max Daily Amount: 900 mg, Disp-90 capsule, R-0Normal  2. Neuralgia  -     gabapentin (NEURONTIN) 300 MG capsule; Take 1 capsule by mouth 3 times daily. Max Daily Amount: 900 mg, Disp-90 capsule, R-0Normal      With his worsening arm pain discussed the possibility of a trail of Neurontin with proper use and potential side effects discussed.  Recommended following up with ortho.  Discussed proper care of involved arm and signs and symptoms that would require further medical attention.  All questions were answered and patient voiced understanding and agreement with plan as discussed.    Return if symptoms worsen or fail to improve, for next scheduled follow up with PCP.       Subjective       HPI   Has had numbness and pain in his left forearm and has seen ortho and had gotten injections and medication and states that it is getting worse and recently he started to have left shoulder pain. He states that he fell to hurt the shoulder and thinks that it just more muscular at this point but the arm is getting worse.   Arm feels numb and asleep.     Review of Systems   Constitutional:  Negative for activity change, appetite change, fatigue and fever.   HENT:  Negative for congestion and rhinorrhea.    Respiratory:  Negative for cough and shortness of breath.    Cardiovascular:  Negative for chest pain and palpitations.   Gastrointestinal:  Negative for abdominal pain, constipation, diarrhea,

## 2024-04-27 ASSESSMENT — ENCOUNTER SYMPTOMS
RHINORRHEA: 0
NAUSEA: 0
COUGH: 0
ABDOMINAL PAIN: 0
SHORTNESS OF BREATH: 0
CONSTIPATION: 0
VOMITING: 0
DIARRHEA: 0
BACK PAIN: 0

## 2024-05-14 DIAGNOSIS — F90.2 ATTENTION DEFICIT HYPERACTIVITY DISORDER (ADHD), COMBINED TYPE: ICD-10-CM

## 2024-05-14 RX ORDER — DEXTROAMPHETAMINE SACCHARATE, AMPHETAMINE ASPARTATE MONOHYDRATE, DEXTROAMPHETAMINE SULFATE AND AMPHETAMINE SULFATE 5; 5; 5; 5 MG/1; MG/1; MG/1; MG/1
20 CAPSULE, EXTENDED RELEASE ORAL EVERY MORNING
Qty: 30 CAPSULE | Refills: 0 | Status: SHIPPED | OUTPATIENT
Start: 2024-05-14 | End: 2025-05-14

## 2024-05-21 SDOH — HEALTH STABILITY: PHYSICAL HEALTH: ON AVERAGE, HOW MANY MINUTES DO YOU ENGAGE IN EXERCISE AT THIS LEVEL?: 150+ MIN

## 2024-05-21 SDOH — HEALTH STABILITY: PHYSICAL HEALTH: ON AVERAGE, HOW MANY DAYS PER WEEK DO YOU ENGAGE IN MODERATE TO STRENUOUS EXERCISE (LIKE A BRISK WALK)?: 6 DAYS

## 2024-05-24 ENCOUNTER — OFFICE VISIT (OUTPATIENT)
Dept: PRIMARY CARE CLINIC | Age: 34
End: 2024-05-24

## 2024-05-24 VITALS
HEART RATE: 88 BPM | SYSTOLIC BLOOD PRESSURE: 136 MMHG | OXYGEN SATURATION: 100 % | HEIGHT: 74 IN | TEMPERATURE: 97.4 F | WEIGHT: 198 LBS | RESPIRATION RATE: 16 BRPM | BODY MASS INDEX: 25.41 KG/M2 | DIASTOLIC BLOOD PRESSURE: 80 MMHG

## 2024-05-24 DIAGNOSIS — S69.91XS INJURY OF RIGHT HAND, SEQUELA: ICD-10-CM

## 2024-05-24 DIAGNOSIS — Z76.89 ENCOUNTER TO ESTABLISH CARE: Primary | ICD-10-CM

## 2024-05-24 DIAGNOSIS — J45.20 MILD INTERMITTENT ASTHMA WITHOUT COMPLICATION: ICD-10-CM

## 2024-05-24 DIAGNOSIS — F90.2 ATTENTION DEFICIT HYPERACTIVITY DISORDER (ADHD), COMBINED TYPE: ICD-10-CM

## 2024-05-24 ASSESSMENT — ENCOUNTER SYMPTOMS
WHEEZING: 0
SHORTNESS OF BREATH: 0
BLOOD IN STOOL: 0
ABDOMINAL PAIN: 0
CHEST TIGHTNESS: 0

## 2024-05-24 NOTE — PROGRESS NOTES
Mele Gonzalez (:  1990) is a 33 y.o. male,New patient, here for evaluation of the following chief complaint(s):  New Patient (Here today to establish care.  Recent FMD was Dr. Meek.  Patient is in process of filing disability for right hand ( table saw accident Senior year of high school) and left arm pain. [facility]  ) and ADHD (Controlled for the most part with medication.  )      Assessment & Plan   ASSESSMENT/PLAN:  1. Encounter to establish care  2. Attention deficit hyperactivity disorder (ADHD), combined type  3. Injury of right hand, sequela  4. Mild intermittent asthma without complication      Counseled patient that I do not do disability evaluations however I did encourage him to call over to the [facility] to the disability office as they have physicians that do this full-time.  Patient is interested in switching ADHD medicines.  I do think he may be a good candidate for astarys given that he has tried and failed 2 different medications at this point.  I discussed with him I do not do controlled substances on the first visit and he can return for reinitiation of this.  Gabapentin was ineffective according to patient and this has been removed from his medicine list.  Will maintain albuterol as prescribed.  Asthma well-controlled  Maintain follow-ups with orthopedics as scheduled      Return in about 2 weeks (around 2024) for ADHD.         Subjective   SUBJECTIVE/OBJECTIVE:  Mele Gonzalez is a 33 y.o. male who presents to establish care.  Patient says he is currently undergoing evaluation for disability in relation to a prior hand injury.  Patient says that when he was 17 he had accidentally injured his hand in a table saw which subsequently required several surgeries to repair.  Patient says that as a result of this he has taken Faver to his other hand and is now affected by issues with both hands.  Patient has been seeing orthopedics for regular

## 2024-06-07 ENCOUNTER — HOSPITAL ENCOUNTER (OUTPATIENT)
Dept: MRI IMAGING | Age: 34
Discharge: HOME | End: 2024-06-07
Payer: MEDICAID

## 2024-06-07 DIAGNOSIS — M77.12 LATERAL EPICONDYLITIS OF LEFT ELBOW: ICD-10-CM

## 2024-06-07 PROCEDURE — 73221 MRI JOINT UPR EXTREM W/O DYE: CPT

## 2024-06-08 SDOH — ECONOMIC STABILITY: FOOD INSECURITY: WITHIN THE PAST 12 MONTHS, THE FOOD YOU BOUGHT JUST DIDN'T LAST AND YOU DIDN'T HAVE MONEY TO GET MORE.: NEVER TRUE

## 2024-06-08 SDOH — ECONOMIC STABILITY: INCOME INSECURITY: HOW HARD IS IT FOR YOU TO PAY FOR THE VERY BASICS LIKE FOOD, HOUSING, MEDICAL CARE, AND HEATING?: VERY HARD

## 2024-06-08 SDOH — ECONOMIC STABILITY: FOOD INSECURITY: WITHIN THE PAST 12 MONTHS, YOU WORRIED THAT YOUR FOOD WOULD RUN OUT BEFORE YOU GOT MONEY TO BUY MORE.: SOMETIMES TRUE

## 2024-06-08 SDOH — ECONOMIC STABILITY: TRANSPORTATION INSECURITY
IN THE PAST 12 MONTHS, HAS LACK OF TRANSPORTATION KEPT YOU FROM MEETINGS, WORK, OR FROM GETTING THINGS NEEDED FOR DAILY LIVING?: NO

## 2024-06-10 ENCOUNTER — OFFICE VISIT (OUTPATIENT)
Dept: PRIMARY CARE CLINIC | Age: 34
End: 2024-06-10
Payer: MEDICAID

## 2024-06-10 VITALS
DIASTOLIC BLOOD PRESSURE: 72 MMHG | WEIGHT: 202.8 LBS | BODY MASS INDEX: 26.03 KG/M2 | SYSTOLIC BLOOD PRESSURE: 122 MMHG | OXYGEN SATURATION: 97 % | RESPIRATION RATE: 18 BRPM | HEIGHT: 74 IN | TEMPERATURE: 97.2 F | HEART RATE: 84 BPM

## 2024-06-10 DIAGNOSIS — Z79.899 MEDICATION MANAGEMENT: ICD-10-CM

## 2024-06-10 DIAGNOSIS — F90.2 ATTENTION DEFICIT HYPERACTIVITY DISORDER (ADHD), COMBINED TYPE: Primary | ICD-10-CM

## 2024-06-10 LAB
ALCOHOL URINE: NEGATIVE
AMPHETAMINE SCREEN, URINE: NEGATIVE
BARBITURATE SCREEN, URINE: NEGATIVE
BENZODIAZEPINE SCREEN, URINE: NEGATIVE
BUPRENORPHINE URINE: NEGATIVE
COCAINE METABOLITE SCREEN URINE: NEGATIVE
FENTANYL SCREEN, URINE: NEGATIVE
GABAPENTIN SCREEN, URINE: NEGATIVE
MDMA URINE: NEGATIVE
METHADONE SCREEN, URINE: NEGATIVE
METHAMPHETAMINE, URINE: NEGATIVE
OPIATE SCREEN URINE: NEGATIVE
OXYCODONE SCREEN URINE: NEGATIVE
PHENCYCLIDINE SCREEN URINE: NEGATIVE
PROPOXYPHENE SCREEN, URINE: NEGATIVE
SYNTHETIC CANNABINOIDS(K2) SCREEN, URINE: NEGATIVE
THC SCREEN, URINE: POSITIVE
TRAMADOL SCREEN URINE: NEGATIVE
TRICYCLIC ANTIDEPRESSANTS, UR: NEGATIVE

## 2024-06-10 PROCEDURE — 99213 OFFICE O/P EST LOW 20 MIN: CPT | Performed by: FAMILY MEDICINE

## 2024-06-10 PROCEDURE — 3078F DIAST BP <80 MM HG: CPT | Performed by: FAMILY MEDICINE

## 2024-06-10 PROCEDURE — 80305 DRUG TEST PRSMV DIR OPT OBS: CPT | Performed by: FAMILY MEDICINE

## 2024-06-10 PROCEDURE — 3074F SYST BP LT 130 MM HG: CPT | Performed by: FAMILY MEDICINE

## 2024-06-10 RX ORDER — SERDEXMETHYLPHENIDATE AND DEXMETHYLPHENIDATE 7.8; 39.2 MG/1; MG/1
1 CAPSULE ORAL DAILY
Qty: 30 CAPSULE | Refills: 0 | Status: SHIPPED | OUTPATIENT
Start: 2024-06-10

## 2024-06-10 ASSESSMENT — ENCOUNTER SYMPTOMS
BLOOD IN STOOL: 0
WHEEZING: 0
CHEST TIGHTNESS: 0
SHORTNESS OF BREATH: 0
ABDOMINAL PAIN: 0

## 2024-06-10 NOTE — PROGRESS NOTES
nebulization every 6 hours as needed for Wheezing 120 each 3     No current facility-administered medications for this visit.      Family History   Problem Relation Age of Onset    Thyroid Disease Mother     High Blood Pressure Mother     High Blood Pressure Father     Cancer Father     Thyroid Disease Sister       Past Medical History:   Diagnosis Date    ADHD (attention deficit hyperactivity disorder)     BiPAP (biphasic positive airway pressure) dependence     16cm/12cm    GERD (gastroesophageal reflux disease)     Hypertension     Obstructive sleep apnea     AHI:  24.3    Restless leg syndrome       Past Surgical History:   Procedure Laterality Date    CYST REMOVAL      lower back    HAND SURGERY      5 total surgeries    SLEEVE GASTRECTOMY  03/2017      Allergies   Allergen Reactions    Oxycodone-Acetaminophen      Reaction: Nausea and Vomiting        Lab Results   Component Value Date     (H) 10/03/2022    K 4.4 10/03/2022     10/03/2022    CO2 27 10/03/2022    BUN 9 10/03/2022    CREATININE 0.8 10/03/2022    GLUCOSE 84 10/03/2022    CALCIUM 9.2 10/03/2022    BILITOT 0.5 10/03/2022    ALKPHOS 41 10/03/2022    AST 21 10/03/2022    ALT 22 10/03/2022    LABGLOM >60 10/03/2022    GFRAA >59 10/03/2022    GLOB 3.5 05/13/2016        Lab Results   Component Value Date    WBC 6.8 10/03/2022    HGB 14.7 10/03/2022    HCT 46.1 10/03/2022    MCV 96.0 (H) 10/03/2022     10/03/2022                EMR Dragon/transcription disclaimer:  Much of this encounter note is electronic transcription/translation of spoken language toprinted texts.  The electronic translation of spoken language may be erroneous, or at times, nonsensical words or phrases may be inadvertently transcribed.  Although I have reviewed the note for such errors, some may stillexist.      An electronic signature was used to authenticate this note.    --Nguyễn Cleaning MD

## 2024-06-13 RX ORDER — GABAPENTIN 100 MG/1
100 CAPSULE ORAL DAILY PRN
COMMUNITY

## 2024-06-13 NOTE — DISCHARGE INSTRUCTIONS
redness     o Red streaking coming away from the incision  o Increased pain  o Increased temperature above 101.5 degrees      Physical Therapy:        *  Your physical therapy status will be discussed with you postoperatively and at your first post-op appointment. Some injuries will not require physical therapy.      *  If you have a shoulder manipulation, please schedule therapy for the next day      Medications: You will be discharged with the appropriate medications following your surgery. Fill these at the pharmacy and take them as directed on the label. Not all of the medications below may be prescribed. Occasionally, other medications may be prescribed with specific instructions.    Percocet/Norco (oxycodone/hydrocodone with tylenol) - Pain Medication, will cause drowsiness, possibly itchiness (this is NOT an allergy - use benadryl or an over the counter allergy medication such as Claritin or Zyrtec)     o Take 1-2 tablets every 4-6 hours. DO NOT EXCEED 4,000mg of Tylenol in 24 hours.  **DO NOT MIX WITH ALCOHOL, DRIVE WHILE TAKING, OR TAKE with extra TYLENOL**     *  After the 2nd day of surgery, begin weaning pain medication (less pills, increased timeframe)     *  ALL narcotics will cause constipation - take a stool softener frequently.  If you become constipated consider taking Milk of Magnesia as directed on the bottle.    Colace (Docusate) - stool softener, used for constipation    Zofran (Ondansetron) or Phenergan - Anti-nausea medication, will cause drowsiness      **If you are running low on pain medications, please notify us if you need a refill 24-48 hours prior to when you run out, so we can make arrangements to refill the prescription for you if we determine is necessary.  [place] law prohibits \"calling in\" narcotic medication - you will be required to come by the office to  an additional prescription.

## 2024-06-16 PROBLEM — M77.12 LEFT LATERAL EPICONDYLITIS: Status: ACTIVE | Noted: 2024-06-16

## 2024-06-16 NOTE — OP NOTE
Patient Name: Yariel  MRN: [id number]  : 1990    DATE of SURGERY: 2024    SURGEON: Jovi Bradshaw MD    ASSISTANT: NONE     PREOPERATIVE DIAGNOSIS: Left elbow chronic lateral epicondylitis.    POSTOPERATIVE DIAGNOSIS: Left elbow chronic lateral epicondylitis.    PROCEDURE PERFORMED: Left elbow partial lateral epicondylectomy with common extensor tendon repair.      IMPLANTS    None.       ANESTHESIA USED  General endotracheal anesthesia.        OPERATIVE INDICATIONS  The patient is a 33 y.o. male  who has been seen in my clinic on multiple occasions in regards to elbow pain consistent with lateral epicondylitis. His right hand sustained a severe injury in the past leaving him with less than ideal function.   Multiple attempts at conservative treatment in the form of injections and physical therapy were made however the patient continued to have pain, weakness, and loss of function.  The patient wished to proceed with surgery understanding risks, benefits, and alternatives.  Risks included but were not limited to that of anesthesia, bleeding, infection, pain, damage to local structures (lateral antebrachial cutaneous nerve), need for further surgery.  We discussed failure to improve symptoms as well as the loss of function.      ESTIMATED BLOOD LOSS    Less than 10 mL.      SPECIMENS    None.      DRAINS    None.      COMPLICATIONS  None.    PROCEDURE IN DETAIL    The patient was seen in the preoperative holding room.  Once again, the informed consent form was reviewed with the patient and signed.  The site of surgery was marked with the patient's agreement.  The patient was transported to the operating room where a time out was performed identifying the correct patient as well as the operative site.  IV Kefzol 2 g was given as perioperative antibiotics. The operative upper extremity was prepped and draped in usual sterile fashion.   Tourniquet was placed about her right

## 2024-06-18 ENCOUNTER — ANESTHESIA (OUTPATIENT)
Dept: OPERATING ROOM | Age: 34
End: 2024-06-18
Payer: MEDICAID

## 2024-06-18 ENCOUNTER — HOSPITAL ENCOUNTER (OUTPATIENT)
Age: 34
Setting detail: OUTPATIENT SURGERY
Discharge: HOME | End: 2024-06-18
Attending: ORTHOPAEDIC SURGERY | Admitting: ORTHOPAEDIC SURGERY
Payer: MEDICAID

## 2024-06-18 ENCOUNTER — ANESTHESIA EVENT (OUTPATIENT)
Dept: OPERATING ROOM | Age: 34
End: 2024-06-18
Payer: MEDICAID

## 2024-06-18 VITALS
HEIGHT: 74 IN | TEMPERATURE: 97.7 F | HEART RATE: 64 BPM | RESPIRATION RATE: 18 BRPM | DIASTOLIC BLOOD PRESSURE: 64 MMHG | BODY MASS INDEX: 25.67 KG/M2 | OXYGEN SATURATION: 100 % | SYSTOLIC BLOOD PRESSURE: 117 MMHG | WEIGHT: 200 LBS

## 2024-06-18 DIAGNOSIS — M77.12 LEFT LATERAL EPICONDYLITIS: Primary | ICD-10-CM

## 2024-06-18 PROCEDURE — 3700000001 HC ADD 15 MINUTES (ANESTHESIA): Performed by: ORTHOPAEDIC SURGERY

## 2024-06-18 PROCEDURE — 3600000014 HC SURGERY LEVEL 4 ADDTL 15MIN: Performed by: ORTHOPAEDIC SURGERY

## 2024-06-18 PROCEDURE — 2580000003 HC RX 258: Performed by: ORTHOPAEDIC SURGERY

## 2024-06-18 PROCEDURE — 3700000000 HC ANESTHESIA ATTENDED CARE: Performed by: ORTHOPAEDIC SURGERY

## 2024-06-18 PROCEDURE — 2500000003 HC RX 250 WO HCPCS: Performed by: NURSE ANESTHETIST, CERTIFIED REGISTERED

## 2024-06-18 PROCEDURE — 7100000000 HC PACU RECOVERY - FIRST 15 MIN: Performed by: ORTHOPAEDIC SURGERY

## 2024-06-18 PROCEDURE — 2580000003 HC RX 258: Performed by: NURSE ANESTHETIST, CERTIFIED REGISTERED

## 2024-06-18 PROCEDURE — 2709999900 HC NON-CHARGEABLE SUPPLY: Performed by: ORTHOPAEDIC SURGERY

## 2024-06-18 PROCEDURE — 7100000011 HC PHASE II RECOVERY - ADDTL 15 MIN: Performed by: ORTHOPAEDIC SURGERY

## 2024-06-18 PROCEDURE — 64415 NJX AA&/STRD BRCH PLXS IMG: CPT | Performed by: NURSE ANESTHETIST, CERTIFIED REGISTERED

## 2024-06-18 PROCEDURE — 3600000004 HC SURGERY LEVEL 4 BASE: Performed by: ORTHOPAEDIC SURGERY

## 2024-06-18 PROCEDURE — 7100000001 HC PACU RECOVERY - ADDTL 15 MIN: Performed by: ORTHOPAEDIC SURGERY

## 2024-06-18 PROCEDURE — 6360000002 HC RX W HCPCS: Performed by: ORTHOPAEDIC SURGERY

## 2024-06-18 PROCEDURE — 6360000002 HC RX W HCPCS: Performed by: NURSE ANESTHETIST, CERTIFIED REGISTERED

## 2024-06-18 PROCEDURE — 7100000010 HC PHASE II RECOVERY - FIRST 15 MIN: Performed by: ORTHOPAEDIC SURGERY

## 2024-06-18 RX ORDER — SODIUM CHLORIDE 0.9 % (FLUSH) 0.9 %
5-40 SYRINGE (ML) INJECTION EVERY 12 HOURS SCHEDULED
Status: DISCONTINUED | OUTPATIENT
Start: 2024-06-18 | End: 2024-06-18 | Stop reason: HOSPADM

## 2024-06-18 RX ORDER — MEPERIDINE HYDROCHLORIDE 25 MG/ML
12.5 INJECTION INTRAMUSCULAR; INTRAVENOUS; SUBCUTANEOUS EVERY 5 MIN PRN
Status: DISCONTINUED | OUTPATIENT
Start: 2024-06-18 | End: 2024-06-18 | Stop reason: HOSPADM

## 2024-06-18 RX ORDER — DEXAMETHASONE SODIUM PHOSPHATE 10 MG/ML
INJECTION, SOLUTION INTRAMUSCULAR; INTRAVENOUS PRN
Status: DISCONTINUED | OUTPATIENT
Start: 2024-06-18 | End: 2024-06-18 | Stop reason: SDUPTHER

## 2024-06-18 RX ORDER — PROPOFOL 10 MG/ML
INJECTION, EMULSION INTRAVENOUS PRN
Status: DISCONTINUED | OUTPATIENT
Start: 2024-06-18 | End: 2024-06-18 | Stop reason: SDUPTHER

## 2024-06-18 RX ORDER — SODIUM CHLORIDE 9 MG/ML
INJECTION, SOLUTION INTRAVENOUS PRN
Status: DISCONTINUED | OUTPATIENT
Start: 2024-06-18 | End: 2024-06-18 | Stop reason: HOSPADM

## 2024-06-18 RX ORDER — SODIUM CHLORIDE, SODIUM LACTATE, POTASSIUM CHLORIDE, CALCIUM CHLORIDE 600; 310; 30; 20 MG/100ML; MG/100ML; MG/100ML; MG/100ML
INJECTION, SOLUTION INTRAVENOUS CONTINUOUS PRN
Status: DISCONTINUED | OUTPATIENT
Start: 2024-06-18 | End: 2024-06-18 | Stop reason: SDUPTHER

## 2024-06-18 RX ORDER — SODIUM CHLORIDE, SODIUM LACTATE, POTASSIUM CHLORIDE, CALCIUM CHLORIDE 600; 310; 30; 20 MG/100ML; MG/100ML; MG/100ML; MG/100ML
INJECTION, SOLUTION INTRAVENOUS CONTINUOUS
Status: DISCONTINUED | OUTPATIENT
Start: 2024-06-18 | End: 2024-06-18 | Stop reason: HOSPADM

## 2024-06-18 RX ORDER — HYDROMORPHONE HYDROCHLORIDE 1 MG/ML
0.25 INJECTION, SOLUTION INTRAMUSCULAR; INTRAVENOUS; SUBCUTANEOUS EVERY 5 MIN PRN
Status: DISCONTINUED | OUTPATIENT
Start: 2024-06-18 | End: 2024-06-18 | Stop reason: HOSPADM

## 2024-06-18 RX ORDER — ONDANSETRON 4 MG/1
4 TABLET, FILM COATED ORAL EVERY 8 HOURS PRN
Qty: 10 TABLET | Refills: 0 | Status: SHIPPED | OUTPATIENT
Start: 2024-06-18

## 2024-06-18 RX ORDER — SODIUM CHLORIDE 0.9 % (FLUSH) 0.9 %
5-40 SYRINGE (ML) INJECTION PRN
Status: DISCONTINUED | OUTPATIENT
Start: 2024-06-18 | End: 2024-06-18 | Stop reason: HOSPADM

## 2024-06-18 RX ORDER — FENTANYL CITRATE 50 UG/ML
INJECTION, SOLUTION INTRAMUSCULAR; INTRAVENOUS PRN
Status: DISCONTINUED | OUTPATIENT
Start: 2024-06-18 | End: 2024-06-18 | Stop reason: SDUPTHER

## 2024-06-18 RX ORDER — ONDANSETRON 2 MG/ML
INJECTION INTRAMUSCULAR; INTRAVENOUS PRN
Status: DISCONTINUED | OUTPATIENT
Start: 2024-06-18 | End: 2024-06-18 | Stop reason: SDUPTHER

## 2024-06-18 RX ORDER — LIDOCAINE HYDROCHLORIDE 10 MG/ML
INJECTION, SOLUTION INFILTRATION; PERINEURAL PRN
Status: DISCONTINUED | OUTPATIENT
Start: 2024-06-18 | End: 2024-06-18 | Stop reason: SDUPTHER

## 2024-06-18 RX ORDER — DIPHENHYDRAMINE HYDROCHLORIDE 50 MG/ML
12.5 INJECTION INTRAMUSCULAR; INTRAVENOUS
Status: DISCONTINUED | OUTPATIENT
Start: 2024-06-18 | End: 2024-06-18 | Stop reason: HOSPADM

## 2024-06-18 RX ORDER — DEXMEDETOMIDINE HYDROCHLORIDE 100 UG/ML
INJECTION, SOLUTION INTRAVENOUS PRN
Status: DISCONTINUED | OUTPATIENT
Start: 2024-06-18 | End: 2024-06-18 | Stop reason: SDUPTHER

## 2024-06-18 RX ORDER — NALOXONE HYDROCHLORIDE 0.4 MG/ML
INJECTION, SOLUTION INTRAMUSCULAR; INTRAVENOUS; SUBCUTANEOUS PRN
Status: DISCONTINUED | OUTPATIENT
Start: 2024-06-18 | End: 2024-06-18 | Stop reason: HOSPADM

## 2024-06-18 RX ORDER — OXYCODONE AND ACETAMINOPHEN 7.5; 325 MG/1; MG/1
1 TABLET ORAL EVERY 6 HOURS PRN
Qty: 20 TABLET | Refills: 0 | Status: SHIPPED | OUTPATIENT
Start: 2024-06-18 | End: 2024-06-23

## 2024-06-18 RX ORDER — MIDAZOLAM HYDROCHLORIDE 1 MG/ML
INJECTION INTRAMUSCULAR; INTRAVENOUS PRN
Status: DISCONTINUED | OUTPATIENT
Start: 2024-06-18 | End: 2024-06-18 | Stop reason: SDUPTHER

## 2024-06-18 RX ORDER — METOCLOPRAMIDE HYDROCHLORIDE 5 MG/ML
10 INJECTION INTRAMUSCULAR; INTRAVENOUS
Status: DISCONTINUED | OUTPATIENT
Start: 2024-06-18 | End: 2024-06-18 | Stop reason: HOSPADM

## 2024-06-18 RX ORDER — ROPIVACAINE HYDROCHLORIDE 5 MG/ML
INJECTION, SOLUTION EPIDURAL; INFILTRATION; PERINEURAL
Status: DISCONTINUED | OUTPATIENT
Start: 2024-06-18 | End: 2024-06-18 | Stop reason: SDUPTHER

## 2024-06-18 RX ORDER — HYDROMORPHONE HYDROCHLORIDE 1 MG/ML
0.5 INJECTION, SOLUTION INTRAMUSCULAR; INTRAVENOUS; SUBCUTANEOUS EVERY 5 MIN PRN
Status: DISCONTINUED | OUTPATIENT
Start: 2024-06-18 | End: 2024-06-18 | Stop reason: HOSPADM

## 2024-06-18 RX ORDER — ROPIVACAINE HYDROCHLORIDE 5 MG/ML
30 INJECTION, SOLUTION EPIDURAL; INFILTRATION; PERINEURAL ONCE
Status: DISCONTINUED | OUTPATIENT
Start: 2024-06-18 | End: 2024-06-18 | Stop reason: HOSPADM

## 2024-06-18 RX ADMIN — ONDANSETRON 4 MG: 2 INJECTION INTRAMUSCULAR; INTRAVENOUS at 10:23

## 2024-06-18 RX ADMIN — PROPOFOL 200 MG: 10 INJECTION, EMULSION INTRAVENOUS at 09:51

## 2024-06-18 RX ADMIN — DEXAMETHASONE SODIUM PHOSPHATE 10 MG: 10 INJECTION, SOLUTION INTRAMUSCULAR; INTRAVENOUS at 09:56

## 2024-06-18 RX ADMIN — SODIUM CHLORIDE, POTASSIUM CHLORIDE, SODIUM LACTATE AND CALCIUM CHLORIDE: 600; 310; 30; 20 INJECTION, SOLUTION INTRAVENOUS at 09:47

## 2024-06-18 RX ADMIN — LIDOCAINE HYDROCHLORIDE 50 MG: 10 INJECTION, SOLUTION INFILTRATION; PERINEURAL at 09:50

## 2024-06-18 RX ADMIN — CEFAZOLIN 2000 MG: 2 INJECTION, POWDER, FOR SOLUTION INTRAMUSCULAR; INTRAVENOUS at 09:57

## 2024-06-18 RX ADMIN — ROPIVACAINE HYDROCHLORIDE 25 ML: 5 INJECTION, SOLUTION EPIDURAL; INFILTRATION; PERINEURAL at 09:27

## 2024-06-18 RX ADMIN — MIDAZOLAM 2 MG: 1 INJECTION INTRAMUSCULAR; INTRAVENOUS at 09:48

## 2024-06-18 RX ADMIN — FENTANYL CITRATE 100 MCG: 0.05 INJECTION, SOLUTION INTRAMUSCULAR; INTRAVENOUS at 09:48

## 2024-06-18 RX ADMIN — DEXMEDETOMIDINE HYDROCHLORIDE 10 MCG: 100 INJECTION, SOLUTION, CONCENTRATE INTRAVENOUS at 10:20

## 2024-06-18 RX ADMIN — SODIUM CHLORIDE, POTASSIUM CHLORIDE, SODIUM LACTATE AND CALCIUM CHLORIDE: 600; 310; 30; 20 INJECTION, SOLUTION INTRAVENOUS at 08:59

## 2024-06-18 ASSESSMENT — PAIN - FUNCTIONAL ASSESSMENT
PAIN_FUNCTIONAL_ASSESSMENT: NONE - DENIES PAIN

## 2024-06-18 NOTE — PROGRESS NOTES
All discharge instructions discussed with wife and patient both state understanding. No pain noted.

## 2024-06-18 NOTE — PROGRESS NOTES
CLINICAL PHARMACY NOTE: MEDS TO BEDS    Total # of Prescriptions Filled: 2   The following medications were delivered to the patient:  Discharge Medication List as of 6/18/2024 11:15 AM        START taking these medications    Details   oxyCODONE-acetaminophen (PERCOCET) 7.5-325 MG per tablet Take 1 tablet by mouth every 6 hours as needed for Pain for up to 5 days. Max Daily Amount: 4 tablets, Disp-20 tablet, R-0Normal      ondansetron (ZOFRAN) 4 MG tablet Take 1 tablet by mouth every 8 hours as needed for Nausea or Vomiting, Disp-10 tablet, R-0Normal               Additional Documentation:    Patient family picked up thaddeusbside at pharmacy. No copay.

## 2024-06-18 NOTE — ANESTHESIA PRE PROCEDURE
Department of Anesthesiology  Preprocedure Note       Name:  Mele Gonzalez   Age:  33 y.o.  :  1990                                          MRN:  [id number]         Date:  2024      Surgeon: Surgeon(s):  Jovi Bradshaw MD    Procedure: Procedure(s):  LEFT ELBOW LATERAL EPICONDYLECTOMY WITH COMMON EXTENSOR TENDON REPAIR    Medications prior to admission:   Prior to Admission medications    Medication Sig Start Date End Date Taking? Authorizing Provider   gabapentin (NEURONTIN) 100 MG capsule Take 1 capsule by mouth daily as needed (pain).   Yes Provider, MD Nael   Serdexmethylphen-Dexmethylphen (AZSTARYS) 39.2-7.8 MG CAPS Take 1 Capful by mouth daily Max Daily Amount: 1 Capful 6/10/24   Nguyễn Cleaning MD   albuterol sulfate HFA (PROVENTIL HFA) 108 (90 Base) MCG/ACT inhaler Inhale 2 puffs into the lungs every 6 hours as needed for Wheezing 23   Gabe Meek MD   albuterol (PROVENTIL) (2.5 MG/3ML) 0.083% nebulizer solution Take 3 mLs by nebulization every 6 hours as needed for Wheezing 10/3/22   Gabe Meek MD       Current medications:    Current Facility-Administered Medications   Medication Dose Route Frequency Provider Last Rate Last Admin   • ceFAZolin (ANCEF) 2,000 mg in sterile water 20 mL IV syringe  2,000 mg IntraVENous Once Jovi Bradshaw MD       • lactated ringers IV soln infusion   IntraVENous Continuous Jovi Bradshaw MD           Allergies:  No Known Allergies    Problem List:    Patient Active Problem List   Diagnosis Code   • Hypertension I10   • GERD (gastroesophageal reflux disease) K21.9   • Obstructive sleep apnea G47.33   • Restless leg syndrome G25.81   • Vitamin D deficiency E55.9   • Anxiety F41.9   • Left lateral epicondylitis M77.12       Past Medical History:        Diagnosis Date   • ADHD (attention deficit hyperactivity disorder)    • BiPAP (biphasic positive airway pressure) dependence     no longer since wt loss   • GERD (gastroesophageal reflux

## 2024-06-18 NOTE — BRIEF OP NOTE
Brief Postoperative Note      Patient: Mele Gonzalez  YOB: 1990  MRN: [id number]    Date of Procedure: 6/18/2024    Pre-Op Diagnosis Codes:     * Lateral epicondylitis, left elbow [M77.12]    Post-Op Diagnosis: Same       Procedure(s):  LEFT ELBOW LATERAL EPICONDYLECTOMY WITH COMMON EXTENSOR TENDON REPAIR    Surgeon(s):  Jovi Bradshaw MD    Assistant:  First Assistant: Yamileth Talbot    Anesthesia: General    Estimated Blood Loss (mL): Minimal    Complications: none    Specimens:   * No specimens in log *    Implants:  * No implants in log *      Drains: * No LDAs found *    Findings:  Infection Present At Time Of Surgery (PATOS) (choose all levels that have infection present):  No infection present  Other Findings: see op note    Electronically signed by Jovi Bradshaw MD on 6/18/2024 at 10:42 AM

## 2024-06-18 NOTE — H&P
Pt Name: Mele Gonzalez  MRN: [id number]  YOB: 1990  Date of evaluation: 6/18/2024    H&P including current review of systems was updated in the paper chart and/or the document previously scanned into the record.  There have been no significant changes or new problems since the original evaluation.  The patient's problems continue and indications for contemplated procedure have not changed.    Electronically signed by Jovi Bradshaw MD on 6/18/2024 at 8:57 AM  
Patent
Patent

## 2024-06-18 NOTE — ANESTHESIA PROCEDURE NOTES
Peripheral Block    Patient location during procedure: holding area  Reason for block: procedure for pain, post-op pain management and at surgeon's request  Start time: 6/18/2024 9:27 AM  End time: 6/18/2024 9:30 AM  Staffing  Performed: anesthesiologist   Anesthesiologist: Javi Hillman MD  Performed by: Javi Hillman MD  Authorized by: Joe Osorio APRN - CRNA    Preanesthetic Checklist  Completed: patient identified, IV checked, site marked, risks and benefits discussed, surgical/procedural consents, equipment checked, pre-op evaluation, timeout performed, anesthesia consent given, oxygen available, monitors applied/VS acknowledged, fire risk safety assessment completed and verbalized and blood product R/B/A discussed and consented  Peripheral Block   Patient position: sitting  Prep: ChloraPrep  Provider prep: mask and sterile gloves  Patient monitoring: continuous pulse ox, frequent blood pressure checks, oxygen and cardiac monitor  Block type: Brachial plexus  Interscalene  Laterality: right  Injection technique: single-shot  Guidance: ultrasound guided  Local infiltration: lidocaine  Local infiltration: lidocaine    Needle   Needle type: insulated echogenic nerve stimulator needle   Needle gauge: 20 G  Needle localization: ultrasound guidance  Test dose: negative  Needle length: 10 cm  Assessment   Injection assessment: negative aspiration for heme, no paresthesia on injection, local visualized surrounding nerve on ultrasound and no intravascular symptoms  Paresthesia pain: none  Slow fractionated injection: yes  Hemodynamics: stable  Outcomes: uncomplicated and patient tolerated procedure well    Medications Administered  ropivacaine (NAROPIN) injection 0.5% - Perineural   25 mL - 6/18/2024 9:27:00 AM

## 2024-06-18 NOTE — PROGRESS NOTES
Patient to room 15. He is alert, oriented and able to make needs known. He denies pain. VSS. Wife at bedside.

## 2024-06-18 NOTE — ANESTHESIA POSTPROCEDURE EVALUATION
Department of Anesthesiology  Postprocedure Note    Patient: Mele Gonzalez  MRN: [id number]  YOB: 1990  Date of evaluation: 6/18/2024    Procedure Summary       Date: 06/18/24 Room / Location: [address]    Anesthesia Start: 0947 Anesthesia Stop: 1045    Procedure: LEFT ELBOW LATERAL EPICONDYLECTOMY WITH COMMON EXTENSOR TENDON REPAIR (Left: Elbow) Diagnosis:       Lateral epicondylitis, left elbow      (Lateral epicondylitis, left elbow [M77.12])    Surgeons: Jovi Bradshaw MD Responsible Provider: Joe Osorio APRN - CRNA    Anesthesia Type: general, regional ASA Status: 2            Anesthesia Type: No value filed.    Zina Phase I: Zina Score: 10    Zina Phase II:      Anesthesia Post Evaluation    Patient location during evaluation: PACU  Patient participation: complete - patient participated  Level of consciousness: sleepy but conscious  Pain score: 0  Airway patency: patent  Nausea & Vomiting: no nausea and no vomiting  Cardiovascular status: hemodynamically stable and blood pressure returned to baseline  Respiratory status: acceptable, spontaneous ventilation, nonlabored ventilation and room air  Hydration status: stable  Comments: /69   Pulse 58   Temp 97.3 °F (36.3 °C) (Skin)   Resp 18   Ht 1.88 m (6' 2\")   Wt 90.7 kg (200 lb)   SpO2 99%   BMI 25.68 kg/m²     Pain management: adequate    No notable events documented.

## 2024-06-26 ENCOUNTER — HOSPITAL ENCOUNTER (EMERGENCY)
Age: 34
Discharge: HOME | End: 2024-06-27
Attending: PEDIATRICS
Payer: MEDICAID

## 2024-06-26 ENCOUNTER — APPOINTMENT (OUTPATIENT)
Dept: GENERAL RADIOLOGY | Age: 34
End: 2024-06-26
Payer: MEDICAID

## 2024-06-26 ENCOUNTER — APPOINTMENT (OUTPATIENT)
Dept: CT IMAGING | Age: 34
End: 2024-06-26
Payer: MEDICAID

## 2024-06-26 DIAGNOSIS — E86.1 HYPOVOLEMIA: ICD-10-CM

## 2024-06-26 DIAGNOSIS — R55 VASOVAGAL SYNCOPE: Primary | ICD-10-CM

## 2024-06-26 LAB
ALBUMIN SERPL-MCNC: 4.5 G/DL (ref 3.5–5.2)
ALP SERPL-CCNC: 53 U/L (ref 40–130)
ALT SERPL-CCNC: 14 U/L (ref 5–41)
ANION GAP SERPL CALCULATED.3IONS-SCNC: 14 MMOL/L (ref 7–19)
AST SERPL-CCNC: 20 U/L (ref 5–40)
BASOPHILS # BLD: 0.1 K/UL (ref 0–0.2)
BASOPHILS NFR BLD: 0.7 % (ref 0–1)
BILIRUB SERPL-MCNC: 0.3 MG/DL (ref 0.2–1.2)
BUN SERPL-MCNC: 14 MG/DL (ref 6–20)
CALCIUM SERPL-MCNC: 9.3 MG/DL (ref 8.6–10)
CHLORIDE SERPL-SCNC: 102 MMOL/L (ref 98–111)
CO2 SERPL-SCNC: 26 MMOL/L (ref 22–29)
CREAT SERPL-MCNC: 0.9 MG/DL (ref 0.5–1.2)
EOSINOPHIL # BLD: 0.5 K/UL (ref 0–0.6)
EOSINOPHIL NFR BLD: 3.3 % (ref 0–5)
ERYTHROCYTE [DISTWIDTH] IN BLOOD BY AUTOMATED COUNT: 13.1 % (ref 11.5–14.5)
GLUCOSE SERPL-MCNC: 174 MG/DL (ref 74–109)
HCT VFR BLD AUTO: 46.6 % (ref 42–52)
HGB BLD-MCNC: 15.6 G/DL (ref 14–18)
IMM GRANULOCYTES # BLD: 0.1 K/UL
LACTATE BLDV-SCNC: 2.5 MMOL/L (ref 0.5–1.9)
LYMPHOCYTES # BLD: 3.6 K/UL (ref 1.1–4.5)
LYMPHOCYTES NFR BLD: 26.3 % (ref 20–40)
MAGNESIUM SERPL-MCNC: 1.9 MG/DL (ref 1.6–2.6)
MCH RBC QN AUTO: 30.8 PG (ref 27–31)
MCHC RBC AUTO-ENTMCNC: 33.5 G/DL (ref 33–37)
MCV RBC AUTO: 92.1 FL (ref 80–94)
MONOCYTES # BLD: 1.1 K/UL (ref 0–0.9)
MONOCYTES NFR BLD: 7.6 % (ref 0–10)
NEUTROPHILS # BLD: 8.6 K/UL (ref 1.5–7.5)
NEUTS SEG NFR BLD: 61.7 % (ref 50–65)
PLATELET # BLD AUTO: 289 K/UL (ref 130–400)
PMV BLD AUTO: 10.6 FL (ref 9.4–12.4)
POTASSIUM SERPL-SCNC: 3.7 MMOL/L (ref 3.5–5)
PROT SERPL-MCNC: 7.5 G/DL (ref 6.6–8.7)
RBC # BLD AUTO: 5.06 M/UL (ref 4.7–6.1)
SODIUM SERPL-SCNC: 142 MMOL/L (ref 136–145)
TROPONIN, HIGH SENSITIVITY: <6 NG/L (ref 0–22)
WBC # BLD AUTO: 13.9 K/UL (ref 4.8–10.8)

## 2024-06-26 PROCEDURE — 70450 CT HEAD/BRAIN W/O DYE: CPT

## 2024-06-26 PROCEDURE — 80053 COMPREHEN METABOLIC PANEL: CPT

## 2024-06-26 PROCEDURE — 83605 ASSAY OF LACTIC ACID: CPT

## 2024-06-26 PROCEDURE — 83735 ASSAY OF MAGNESIUM: CPT

## 2024-06-26 PROCEDURE — 2580000003 HC RX 258: Performed by: PEDIATRICS

## 2024-06-26 PROCEDURE — 96361 HYDRATE IV INFUSION ADD-ON: CPT

## 2024-06-26 PROCEDURE — 36415 COLL VENOUS BLD VENIPUNCTURE: CPT

## 2024-06-26 PROCEDURE — 84484 ASSAY OF TROPONIN QUANT: CPT

## 2024-06-26 PROCEDURE — 71045 X-RAY EXAM CHEST 1 VIEW: CPT

## 2024-06-26 PROCEDURE — 85025 COMPLETE CBC W/AUTO DIFF WBC: CPT

## 2024-06-26 PROCEDURE — 93005 ELECTROCARDIOGRAM TRACING: CPT | Performed by: PEDIATRICS

## 2024-06-26 PROCEDURE — 96360 HYDRATION IV INFUSION INIT: CPT

## 2024-06-26 PROCEDURE — 99285 EMERGENCY DEPT VISIT HI MDM: CPT

## 2024-06-26 RX ORDER — 0.9 % SODIUM CHLORIDE 0.9 %
1000 INTRAVENOUS SOLUTION INTRAVENOUS ONCE
Status: COMPLETED | OUTPATIENT
Start: 2024-06-26 | End: 2024-06-27

## 2024-06-26 RX ADMIN — SODIUM CHLORIDE 1000 ML: 9 INJECTION, SOLUTION INTRAVENOUS at 22:51

## 2024-06-27 VITALS
BODY MASS INDEX: 25.68 KG/M2 | DIASTOLIC BLOOD PRESSURE: 65 MMHG | RESPIRATION RATE: 16 BRPM | OXYGEN SATURATION: 96 % | SYSTOLIC BLOOD PRESSURE: 112 MMHG | HEART RATE: 64 BPM | WEIGHT: 200 LBS | TEMPERATURE: 97.6 F

## 2024-06-27 LAB
EKG P AXIS: 80 DEGREES
EKG P-R INTERVAL: 168 MS
EKG Q-T INTERVAL: 356 MS
EKG QRS DURATION: 100 MS
EKG QTC CALCULATION (BAZETT): 410 MS
EKG T AXIS: 75 DEGREES
LACTATE BLDV-SCNC: 0.7 MMOL/L (ref 0.5–1.9)

## 2024-06-27 PROCEDURE — 93010 ELECTROCARDIOGRAM REPORT: CPT | Performed by: INTERNAL MEDICINE

## 2024-06-27 PROCEDURE — 36415 COLL VENOUS BLD VENIPUNCTURE: CPT

## 2024-06-27 PROCEDURE — 83605 ASSAY OF LACTIC ACID: CPT

## 2024-06-27 PROCEDURE — 96361 HYDRATE IV INFUSION ADD-ON: CPT

## 2024-06-27 NOTE — ED PROVIDER NOTES
DIFFERENTIAL - Abnormal; Notable for the following components:       Result Value    WBC 13.9 (*)     Neutrophils Absolute 8.6 (*)     Monocytes Absolute 1.10 (*)     All other components within normal limits   COMPREHENSIVE METABOLIC PANEL - Abnormal; Notable for the following components:    Glucose 174 (*)     All other components within normal limits   LACTIC ACID - Abnormal; Notable for the following components:    Lactic Acid 2.5 (*)     All other components within normal limits    Narrative:     CALL  [facility]  [facility] tel. ,  Chemistry results called to and read back by Mj Dejesus RN in ED,  06/26/2024 22:55, by WES   TROPONIN   MAGNESIUM   LACTIC ACID       All other labs were within normal range or not returned as of this dictation.    EMERGENCY DEPARTMENT COURSE and DIFFERENTIAL DIAGNOSIS/MDM:   Vitals:    Vitals:    06/26/24 2133 06/27/24 0000 06/27/24 0117   BP: (!) 151/66  112/65   Pulse: 79 63 64   Resp: 16 16    Temp: 97.6 °F (36.4 °C)     SpO2: 96% 96%    Weight: 90.7 kg (200 lb)         MDM     Amount and/or Complexity of Data Reviewed  Clinical lab tests: reviewed  Tests in the radiology section of CPT®: reviewed  Tests in the medicine section of CPT®: reviewed        Reassessment  ***    CONSULTS:  None    PROCEDURES:  Unless otherwise noted below, none     Procedures    FINAL IMPRESSION      1. Vasovagal syncope    2. Hypovolemia          DISPOSITION/PLAN   DISPOSITION Decision To Discharge 06/27/2024 01:28:50 AM      PATIENT REFERRED TO:  Nguyễn Cleaning MD  [address] [facility] [id number] [phone]    Schedule an appointment as soon as possible for a visit   Call later this morning to arrange appointment.      DISCHARGE MEDICATIONS:  Discharge Medication List as of 6/27/2024  1:23 AM             (Please note that portions of this note were completed with a voice recognition program.  Efforts were made to edit thedictations but occasionally words are mis-transcribed.)    Clover TORRES  recognition program.  Efforts were made to edit thedictations but occasionally words are mis-transcribed.)    Clover Sandoval MD (electronically signed)  Attending Emergency Physician          Clover Sandoval MD  07/01/24 0171

## 2024-06-27 NOTE — ED NOTES
See neuro assessment, wife was with pt during this time, pt denies any injury and wife was able to lower him down both times he passed out. Pt recently had surgery on left elbow, no obvious injuries to that site noted.

## 2024-07-01 ASSESSMENT — ENCOUNTER SYMPTOMS
ABDOMINAL PAIN: 0
VOMITING: 0
COLOR CHANGE: 0
BACK PAIN: 0
COUGH: 0
NAUSEA: 1
SHORTNESS OF BREATH: 0
RHINORRHEA: 0

## 2024-07-02 ENCOUNTER — OFFICE VISIT (OUTPATIENT)
Dept: FAMILY MEDICINE CLINIC | Age: 34
End: 2024-07-02
Payer: MEDICAID

## 2024-07-02 VITALS
DIASTOLIC BLOOD PRESSURE: 60 MMHG | OXYGEN SATURATION: 97 % | TEMPERATURE: 99 F | HEART RATE: 70 BPM | WEIGHT: 199.4 LBS | SYSTOLIC BLOOD PRESSURE: 108 MMHG | BODY MASS INDEX: 25.59 KG/M2 | HEIGHT: 74 IN | RESPIRATION RATE: 16 BRPM

## 2024-07-02 DIAGNOSIS — R73.9 HYPERGLYCEMIA: ICD-10-CM

## 2024-07-02 DIAGNOSIS — R55 SYNCOPE, UNSPECIFIED SYNCOPE TYPE: ICD-10-CM

## 2024-07-02 DIAGNOSIS — R55 SYNCOPE, UNSPECIFIED SYNCOPE TYPE: Primary | ICD-10-CM

## 2024-07-02 DIAGNOSIS — F90.2 ATTENTION DEFICIT HYPERACTIVITY DISORDER (ADHD), COMBINED TYPE: ICD-10-CM

## 2024-07-02 LAB
ALBUMIN SERPL-MCNC: 4.4 G/DL (ref 3.5–5.2)
ALP SERPL-CCNC: 50 U/L (ref 40–130)
ALT SERPL-CCNC: 14 U/L (ref 5–41)
ANION GAP SERPL CALCULATED.3IONS-SCNC: 12 MMOL/L (ref 7–19)
AST SERPL-CCNC: 17 U/L (ref 5–40)
BASOPHILS # BLD: 0.1 K/UL (ref 0–0.2)
BASOPHILS NFR BLD: 1 % (ref 0–1)
BILIRUB SERPL-MCNC: 0.5 MG/DL (ref 0.2–1.2)
BUN SERPL-MCNC: 11 MG/DL (ref 6–20)
CALCIUM SERPL-MCNC: 9.4 MG/DL (ref 8.6–10)
CHLORIDE SERPL-SCNC: 102 MMOL/L (ref 98–111)
CO2 SERPL-SCNC: 28 MMOL/L (ref 22–29)
CREAT SERPL-MCNC: 0.8 MG/DL (ref 0.5–1.2)
EOSINOPHIL # BLD: 0.3 K/UL (ref 0–0.6)
EOSINOPHIL NFR BLD: 3.2 % (ref 0–5)
ERYTHROCYTE [DISTWIDTH] IN BLOOD BY AUTOMATED COUNT: 13 % (ref 11.5–14.5)
GLUCOSE SERPL-MCNC: 94 MG/DL (ref 74–109)
HBA1C MFR BLD: 4.9 % (ref 4–6)
HCT VFR BLD AUTO: 45.3 % (ref 42–52)
HGB BLD-MCNC: 14.9 G/DL (ref 14–18)
IMM GRANULOCYTES # BLD: 0 K/UL
LYMPHOCYTES # BLD: 2.1 K/UL (ref 1.1–4.5)
LYMPHOCYTES NFR BLD: 22.6 % (ref 20–40)
MCH RBC QN AUTO: 30.8 PG (ref 27–31)
MCHC RBC AUTO-ENTMCNC: 32.9 G/DL (ref 33–37)
MCV RBC AUTO: 93.8 FL (ref 80–94)
MONOCYTES # BLD: 0.8 K/UL (ref 0–0.9)
MONOCYTES NFR BLD: 8.7 % (ref 0–10)
NEUTROPHILS # BLD: 5.9 K/UL (ref 1.5–7.5)
NEUTS SEG NFR BLD: 64.2 % (ref 50–65)
PLATELET # BLD AUTO: 253 K/UL (ref 130–400)
PMV BLD AUTO: 11.5 FL (ref 9.4–12.4)
POTASSIUM SERPL-SCNC: 4 MMOL/L (ref 3.5–5)
PROT SERPL-MCNC: 7.2 G/DL (ref 6.6–8.7)
RBC # BLD AUTO: 4.83 M/UL (ref 4.7–6.1)
SODIUM SERPL-SCNC: 142 MMOL/L (ref 136–145)
WBC # BLD AUTO: 9.2 K/UL (ref 4.8–10.8)

## 2024-07-02 PROCEDURE — 99214 OFFICE O/P EST MOD 30 MIN: CPT | Performed by: FAMILY MEDICINE

## 2024-07-02 PROCEDURE — 3078F DIAST BP <80 MM HG: CPT | Performed by: FAMILY MEDICINE

## 2024-07-02 PROCEDURE — 3074F SYST BP LT 130 MM HG: CPT | Performed by: FAMILY MEDICINE

## 2024-07-02 RX ORDER — DEXTROAMPHETAMINE SACCHARATE, AMPHETAMINE ASPARTATE, DEXTROAMPHETAMINE SULFATE AND AMPHETAMINE SULFATE 2.5; 2.5; 2.5; 2.5 MG/1; MG/1; MG/1; MG/1
10 TABLET ORAL 2 TIMES DAILY
Qty: 60 TABLET | Refills: 0 | Status: SHIPPED | OUTPATIENT
Start: 2024-08-01 | End: 2025-08-01

## 2024-07-02 RX ORDER — DEXTROAMPHETAMINE SACCHARATE, AMPHETAMINE ASPARTATE, DEXTROAMPHETAMINE SULFATE AND AMPHETAMINE SULFATE 2.5; 2.5; 2.5; 2.5 MG/1; MG/1; MG/1; MG/1
10 TABLET ORAL 2 TIMES DAILY
Qty: 60 TABLET | Refills: 0 | Status: SHIPPED | OUTPATIENT
Start: 2024-07-02 | End: 2025-07-02

## 2024-07-02 ASSESSMENT — ENCOUNTER SYMPTOMS
BACK PAIN: 0
VOMITING: 0
COUGH: 0
SHORTNESS OF BREATH: 0
CONSTIPATION: 0
NAUSEA: 0
ABDOMINAL PAIN: 0
DIARRHEA: 0
RHINORRHEA: 0

## 2024-07-02 NOTE — PROGRESS NOTES
.   LJ BROOKE [address] [place] [address]  Dept: [phone]  Dept Fax: [phone]    Visit type: Established patient    Reason for Visit: Loss of Consciousness      Assessment & Plan   Assessment and Plan       1. Syncope, unspecified syncope type  -     CBC with Auto Differential; Future  -     Comprehensive Metabolic Panel; Future  -     Hemoglobin A1C; Future  2. Hyperglycemia  -     Hemoglobin A1C; Future  3. Attention deficit hyperactivity disorder (ADHD), combined type  -     amphetamine-dextroamphetamine (ADDERALL, 10MG,) 10 MG tablet; Take 1 tablet by mouth 2 times daily. Max Daily Amount: 20 mg, Disp-60 tablet, R-0Normal  -     amphetamine-dextroamphetamine (ADDERALL, 10MG,) 10 MG tablet; Take 1 tablet by mouth 2 times daily. Max Daily Amount: 20 mg, Disp-60 tablet, R-0Normal    Patient was positive for THC on his Avelina 10 UDS and explained to patient that I will give him a one-time only warning if he was ever positive for THC in the future and I would no longer be able to prescribe controlled substances for him.  Patient voiced understanding .  Discussed that if he ends up seeing anyone else in the future and gets a controlled substance that we are managing that I would longer be prescribing controlled substances for him either in that scenario and he voiced understanding but with records available and patient's history and information provided today discussed that we could continue with his ADHD management and discussed management options and with his history doing well or as good with the Adderall 10 twice a day of immediate release we will proceed with that course at this time continue to monitor and adjust as course dictates.  Discussed potential culprits for his syncopal episodes and potential further testing but patient was agreeable to labs and continued monitoring at this time and we will pursue further workup moving forward depending on results and symptoms moving forward

## 2024-07-03 ENCOUNTER — TELEPHONE (OUTPATIENT)
Dept: FAMILY MEDICINE CLINIC | Age: 34
End: 2024-07-03

## 2024-07-10 ENCOUNTER — TELEPHONE (OUTPATIENT)
Dept: FAMILY MEDICINE CLINIC | Age: 34
End: 2024-07-10

## 2024-08-19 ENCOUNTER — TELEMEDICINE (OUTPATIENT)
Dept: FAMILY MEDICINE CLINIC | Age: 34
End: 2024-08-19
Payer: MEDICAID

## 2024-08-19 DIAGNOSIS — F90.2 ATTENTION DEFICIT HYPERACTIVITY DISORDER (ADHD), COMBINED TYPE: ICD-10-CM

## 2024-08-19 PROCEDURE — 99213 OFFICE O/P EST LOW 20 MIN: CPT | Performed by: FAMILY MEDICINE

## 2024-08-19 RX ORDER — DEXTROAMPHETAMINE SACCHARATE, AMPHETAMINE ASPARTATE, DEXTROAMPHETAMINE SULFATE AND AMPHETAMINE SULFATE 2.5; 2.5; 2.5; 2.5 MG/1; MG/1; MG/1; MG/1
10 TABLET ORAL 2 TIMES DAILY
Qty: 60 TABLET | Refills: 0 | Status: SHIPPED | OUTPATIENT
Start: 2024-09-17 | End: 2025-09-17

## 2024-08-19 RX ORDER — DEXTROAMPHETAMINE SACCHARATE, AMPHETAMINE ASPARTATE, DEXTROAMPHETAMINE SULFATE AND AMPHETAMINE SULFATE 2.5; 2.5; 2.5; 2.5 MG/1; MG/1; MG/1; MG/1
10 TABLET ORAL 2 TIMES DAILY
Qty: 60 TABLET | Refills: 0 | Status: SHIPPED | OUTPATIENT
Start: 2024-08-19 | End: 2025-08-19

## 2024-08-19 ASSESSMENT — ENCOUNTER SYMPTOMS
CONSTIPATION: 0
COUGH: 0
ABDOMINAL PAIN: 0
DIARRHEA: 0
SHORTNESS OF BREATH: 0
BACK PAIN: 0
VOMITING: 0
RHINORRHEA: 0
NAUSEA: 0

## 2024-08-19 NOTE — PROGRESS NOTES
Mele Gonzalez, was evaluated through a synchronous (real-time) audio-video encounter. The patient (or guardian if applicable) is aware that this is a billable service, which includes applicable co-pays. This Virtual Visit was conducted with patient's (and/or legal guardian's) consent. Patient identification was verified, and a caregiver was present when appropriate.   The patient was located at Home: [address] [place] [address] [place] [address]  Provider was located at Facility (Appt Dept): [address] [place] [id number]  Confirm you are appropriately licensed, registered, or certified to deliver care in the state where the patient is located as indicated above. If you are not or unsure, please re-schedule the visit: Yes, I confirm.     Mele Gonzalez (:  1990) is a Established patient, presenting virtually for evaluation of the following:      Below is the assessment and plan developed based on review of pertinent history, physical exam, labs, studies, and medications.     Assessment & Plan  Attention deficit hyperactivity disorder (ADHD), combined type   At goal, continue current medications    Orders:    amphetamine-dextroamphetamine (ADDERALL, 10MG,) 10 MG tablet; Take 1 tablet by mouth 2 times daily. Max Daily Amount: 20 mg    amphetamine-dextroamphetamine (ADDERALL, 10MG,) 10 MG tablet; Take 1 tablet by mouth 2 times daily. Max Daily Amount: 20 mg      Return in about 2 months (around 10/19/2024), or if symptoms worsen or fail to improve.       Subjective   HPI  Patient has ADHD and is doing well with his current dose of  Adderall.  he reports that the medicine allows him to stay focused and complete his tasks.  Without the medication he has more problems remaining focused and doing the things that he needs to do. he denies any issues with the medication and reports that he continues to have a normal appetite and denies any changes with his sleep with the use of medicine.      Review

## 2024-10-22 ENCOUNTER — OFFICE VISIT (OUTPATIENT)
Dept: FAMILY MEDICINE CLINIC | Age: 34
End: 2024-10-22
Payer: MEDICAID

## 2024-10-22 ENCOUNTER — HOSPITAL ENCOUNTER (OUTPATIENT)
Dept: GENERAL RADIOLOGY | Age: 34
Discharge: HOME | End: 2024-10-22
Payer: MEDICAID

## 2024-10-22 VITALS
HEART RATE: 108 BPM | DIASTOLIC BLOOD PRESSURE: 78 MMHG | SYSTOLIC BLOOD PRESSURE: 152 MMHG | OXYGEN SATURATION: 99 % | WEIGHT: 208 LBS | HEIGHT: 74 IN | TEMPERATURE: 98.2 F | BODY MASS INDEX: 26.69 KG/M2

## 2024-10-22 DIAGNOSIS — S69.92XA HAND INJURY, LEFT, INITIAL ENCOUNTER: ICD-10-CM

## 2024-10-22 DIAGNOSIS — S69.92XA HAND INJURY, LEFT, INITIAL ENCOUNTER: Primary | ICD-10-CM

## 2024-10-22 DIAGNOSIS — J45.20 MILD INTERMITTENT ASTHMA, UNSPECIFIED WHETHER COMPLICATED: ICD-10-CM

## 2024-10-22 DIAGNOSIS — F90.2 ATTENTION DEFICIT HYPERACTIVITY DISORDER (ADHD), COMBINED TYPE: ICD-10-CM

## 2024-10-22 PROCEDURE — 3077F SYST BP >= 140 MM HG: CPT | Performed by: FAMILY MEDICINE

## 2024-10-22 PROCEDURE — 3078F DIAST BP <80 MM HG: CPT | Performed by: FAMILY MEDICINE

## 2024-10-22 PROCEDURE — 99214 OFFICE O/P EST MOD 30 MIN: CPT | Performed by: FAMILY MEDICINE

## 2024-10-22 PROCEDURE — 73130 X-RAY EXAM OF HAND: CPT

## 2024-10-22 RX ORDER — DEXTROAMPHETAMINE SACCHARATE, AMPHETAMINE ASPARTATE, DEXTROAMPHETAMINE SULFATE AND AMPHETAMINE SULFATE 2.5; 2.5; 2.5; 2.5 MG/1; MG/1; MG/1; MG/1
10 TABLET ORAL 2 TIMES DAILY
Qty: 60 TABLET | Refills: 0 | Status: SHIPPED | OUTPATIENT
Start: 2024-11-20 | End: 2025-11-20

## 2024-10-22 RX ORDER — ALBUTEROL SULFATE 0.83 MG/ML
2.5 SOLUTION RESPIRATORY (INHALATION) EVERY 6 HOURS PRN
Qty: 120 EACH | Refills: 3 | Status: CANCELLED | OUTPATIENT
Start: 2024-10-22

## 2024-10-22 RX ORDER — ALBUTEROL SULFATE 90 UG/1
2 INHALANT RESPIRATORY (INHALATION) EVERY 6 HOURS PRN
Qty: 18 G | Refills: 5 | Status: SHIPPED | OUTPATIENT
Start: 2024-10-22

## 2024-10-22 RX ORDER — DEXTROAMPHETAMINE SACCHARATE, AMPHETAMINE ASPARTATE, DEXTROAMPHETAMINE SULFATE AND AMPHETAMINE SULFATE 2.5; 2.5; 2.5; 2.5 MG/1; MG/1; MG/1; MG/1
10 TABLET ORAL 2 TIMES DAILY
Qty: 60 TABLET | Refills: 0 | Status: SHIPPED | OUTPATIENT
Start: 2024-10-22 | End: 2025-10-22

## 2024-10-22 NOTE — PROGRESS NOTES
Michel BROOKE [address] [place] [address]  Dept: [phone]  Dept Fax: [phone]    Visit type: Established patient    Reason for Visit: Medication Refill and Cyst      Assessment & Plan   Assessment and Plan       Assessment & Plan  Mild intermittent asthma, unspecified whether complicated       Orders:    albuterol sulfate HFA (PROVENTIL HFA) 108 (90 Base) MCG/ACT inhaler; Inhale 2 puffs into the lungs every 6 hours as needed for Wheezing    Attention deficit hyperactivity disorder (ADHD), combined type       Orders:    amphetamine-dextroamphetamine (ADDERALL, 10MG,) 10 MG tablet; Take 1 tablet by mouth 2 times daily. Max Daily Amount: 20 mg    amphetamine-dextroamphetamine (ADDERALL, 10MG,) 10 MG tablet; Take 1 tablet by mouth 2 times daily. Max Daily Amount: 20 mg    Hand injury, left, initial encounter       Orders:    XR HAND LEFT (MIN 3 VIEWS); Future      Discussed x-ray for further evaluation of his hand pain and injury.  Discussed asthma exacerbation management and proper use of inhaler.  With patient doing well with his current use of Adderall we will continue at this time with a prescription into the pharmacy for this month and a prescription for next month and assuming no issues arise we will plan on follow-up in 2 months for continued monitoring and management of his ADHD.  Discussed lifestyle modifications that may be beneficial for his symptoms.  Discussed signs symptoms requiring medical attention.  All questions were answered and patient voiced understanding and agreement with plan as discussed.      Return in about 2 months (around 12/22/2024), or if symptoms worsen or fail to improve.       Subjective       HPI   Has developed a seroma on his left elbow following surgery. He is having some forearm pain and state that his hand started hurting the other day and is bruised and he did injure it this summer pretty bad and with the swelling a bruising in his hand he is

## 2024-11-01 ENCOUNTER — OFFICE VISIT (OUTPATIENT)
Age: 34
End: 2024-11-01

## 2024-11-01 VITALS — HEIGHT: 74 IN | WEIGHT: 209 LBS | BODY MASS INDEX: 26.82 KG/M2

## 2024-11-01 DIAGNOSIS — Z47.89 AFTERCARE FOLLOWING SURGERY OF THE MUSCULOSKELETAL SYSTEM: ICD-10-CM

## 2024-11-01 DIAGNOSIS — M77.12 LEFT LATERAL EPICONDYLITIS: Primary | ICD-10-CM

## 2024-11-01 NOTE — PROGRESS NOTES
Orthopaedic Clinic Note    NAME:  Mele Gonzalez   : 1990  MRN: [id number]      2024      CHIEF COMPLAINT:  Follow up Left elbow    HISTORY OF PRESENT ILLNESS:   Mele returns today for follow up of the left elbow.  Date of surgery 2024.  Last visit he was extremely upset with his care.  Although he has regained excellent function, he complains of debilitating pain that radiates down his dorsal forearm musculature.  I recommended that he return to physical therapy.  He has done so and tells me that he does not feel as though he is improving.  He is having different pains now not necessarily just related to his elbow.  He attempted to return to work and was quickly fired as he was told he was a liability.    Past Medical History:        Diagnosis Date    ADHD (attention deficit hyperactivity disorder)     Arthritis     BiPAP (biphasic positive airway pressure) dependence     no longer since wt loss    GERD (gastroesophageal reflux disease)     Hypertension     none since wt. loss    Obstructive sleep apnea     AHI:  24.3    Restless leg syndrome        Past Surgical History:        Procedure Laterality Date    BICEPS TENDON REPAIR Left 2024    LEFT ELBOW LATERAL EPICONDYLECTOMY WITH COMMON EXTENSOR TENDON REPAIR performed by Jovi Bradshaw MD at [facility] OR    COLONOSCOPY      CYST REMOVAL      lower back    ELBOW SURGERY  24    Lateral epidcondile repair    ENDOSCOPY, COLON, DIAGNOSTIC      HAND SURGERY      5 total surgeries    SLEEVE GASTRECTOMY  2017       Current Medications:   Prior to Admission medications    Medication Sig Start Date End Date Taking? Authorizing Provider   amphetamine-dextroamphetamine (ADDERALL, 10MG,) 10 MG tablet Take 1 tablet by mouth 2 times daily. Max Daily Amount: 20 mg 24 Yes Gabe Meek MD   amphetamine-dextroamphetamine (ADDERALL, 10MG,) 10 MG tablet Take 1 tablet by mouth 2 times daily. Max Daily Amount: 20 mg 10/22/24 10/22/25 Yes

## 2024-11-13 ENCOUNTER — TELEPHONE (OUTPATIENT)
Age: 34
End: 2024-11-13

## 2024-11-13 NOTE — TELEPHONE ENCOUNTER
Faxed [phone] [facility] pt care hub  ov 11/1/24 -7-31-24  & Op note.  Sent  11-13-24  LB

## 2024-11-22 ENCOUNTER — TELEPHONE (OUTPATIENT)
Age: 34
End: 2024-11-22
Payer: MEDICAID

## 2024-11-22 DIAGNOSIS — M25.522 LEFT ELBOW PAIN: Primary | ICD-10-CM

## 2024-11-22 NOTE — TELEPHONE ENCOUNTER
Patient was called for imaging but has had an MRI in June at [facility]. LVM with [facility] radiology to push to [facility] for viewing.

## 2024-11-25 ENCOUNTER — HOSPITAL ENCOUNTER (OUTPATIENT)
Dept: GENERAL RADIOLOGY | Facility: HOSPITAL | Age: 34
Discharge: HOME | End: 2024-11-25
Admitting: PHYSICIAN ASSISTANT
Payer: MEDICAID

## 2024-11-25 ENCOUNTER — OFFICE VISIT (OUTPATIENT)
Age: 34
End: 2024-11-25
Payer: MEDICAID

## 2024-11-25 VITALS — HEIGHT: 74 IN | RESPIRATION RATE: 18 BRPM | WEIGHT: 239 LBS | BODY MASS INDEX: 30.67 KG/M2

## 2024-11-25 DIAGNOSIS — M77.12 LATERAL EPICONDYLITIS OF LEFT ELBOW: Primary | ICD-10-CM

## 2024-11-25 DIAGNOSIS — G56.32 RADIAL TUNNEL SYNDROME OF LEFT UPPER EXTREMITY: ICD-10-CM

## 2024-11-25 DIAGNOSIS — M25.522 LEFT ELBOW PAIN: ICD-10-CM

## 2024-11-25 PROCEDURE — 73080 X-RAY EXAM OF ELBOW: CPT

## 2024-11-25 RX ORDER — DEXTROAMPHETAMINE SACCHARATE, AMPHETAMINE ASPARTATE, DEXTROAMPHETAMINE SULFATE AND AMPHETAMINE SULFATE 2.5; 2.5; 2.5; 2.5 MG/1; MG/1; MG/1; MG/1
10 TABLET ORAL 2 TIMES DAILY
COMMUNITY
Start: 2021-11-21 | End: 2025-10-23

## 2024-11-25 NOTE — PROGRESS NOTES
[facility] Sports Medicine  Vasiliy Young MD, PhD  Phan Young PA-C    Chief Complaint:   Chief Complaint   Patient presents with    Left Elbow - Initial Evaluation     Patient presents today for left elbow pain. X-ray performed at [facility] on 11/25/24. Former patient of Dr. Rivera 06/18/24 Lateral epicondylitis surgery. MRI performed at [facility]. Patient states his pain is no better. Patient has tried tens, surgical, NSAIDS, steroid injections, dry needling, and OT therapy prior and post surgery. Patient states pain is in left forearm.         History of Present Illness:   The patient is a 34-year-old who presents with persistent left lateral elbow pain having failed persistent operative and nonoperative management in the past.  He has seen Dr. Rivera back in the summer who performed surgery on him for lateral epicondylitis.  He states that he had pain and swelling postoperatively, underwent a course of OT and PT which unfortunately did not resolve his symptoms.  He states pain is primarily along the lateral aspect of his elbow and radiates into the dorsum of his forearm.  He occasionally has swelling, pain, and endorses weakness in his forearm and hand as a result of this as well.  He has previously tried corticosteroid injections prior to surgical intervention.  He has tried NSAIDs in the past but has a history of gastric sleeve surgery so tries to avoid these.  He is here today for another opinion regarding his persistent lateral left elbow pain.     Past Medical History:   Past Medical History:   Diagnosis Date    Anxiety         Past Surgical History:  Past Surgical History:   Procedure Laterality Date    GASTRIC SLEEVE LAPAROSCOPIC      LATERAL EPICONDYLE RELEASE Left         Social History:   Social History     Socioeconomic History    Marital status: Unknown   Tobacco Use    Smoking status: Never    Smokeless tobacco: Current     Types: Chew   Substance and Sexual Activity    Alcohol use: Yes      Comment: rare        Medications:  Current Outpatient Medications   Medication Instructions    amphetamine-dextroamphetamine (ADDERALL) 10 MG tablet 10 mg, Oral, 2 Times Daily        Allergies:  No Known Allergies    Vital Signs:  Vitals:    11/25/24 1030   Resp: 18     Body mass index is 30.69 kg/m².     Review of Systems:   Review of Systems    Physical Exam:  Vital signs reviewed.   General: No acute distress. Cooperative with exam.   Eyes: conjunctiva clear; pupils equally round and reactive  ENT: external ears and nose atraumatic; oropharynx clear  CV: no peripheral edema, 2+ distal pulses  Resp: normal respiratory effort, no use of accessory muscles  Skin: no rashes or wounds; normal turgor  Psych: mood and affect appropriate; recent and remote memory intact  Neuro: sensation to light touch intact, no focal neurological deficit  Musculoskeletal: On inspection of the patient's left elbow and forearm he has an incision along the lateral epicondyle consistent with prior surgery in the area there is tenderness to palpation in the area.  He primarily has tenderness to palpation and mild swelling along the dorsal aspect of his forearm.  He exhibits weakness, 4 out of 5, with resisted wrist extension and third digit extension.  This also exhibits pain.  Wrist flexion is intact.  Elbow range of motion is well-maintained with flexion extension, pronation supination do cause mild pain.  Distal biceps tendon is intact.  There is numbness with light touch along the dorsal aspect of the hand and digits as well as the volar surface of the hand and digits.  This is in a nonspecific pattern.    Physical Exam     Results Review:   XR --  XR - 1 Result   I have personally reviewed Results for orders placed during the hospital encounter of 11/25/24    XR ELBOW 3+ VW LEFT 11/25/2024   and my interpretation of the image is as follows: I reviewed an AP oblique and lateral of the patient's left elbow which does not demonstrate  any acute osseous abnormality.  No loose bodies are present.  No significant swelling is present.  Image quality is adequate      Assessment:   Diagnoses and all orders for this visit:    1. Lateral epicondylitis of left elbow (Primary)  -     MRI Radius Ulna Left Without Contrast; Future    2. Radial tunnel syndrome of left upper extremity  -     EMG & Nerve Conduction Test; Future  -     MRI Radius Ulna Left Without Contrast; Future         Plan:  Given the chronic recurrent nature of the patient's symptoms and failure of extensive conservative treatment -bracing, physical therapy and Occupational Therapy for several weeks, pain medications, NSAIDs, and 3 prior injections around the lateral epicondyle - as well as surgical management, we will plan for EMG/nerve conduction study of the left upper extremity to rule out radial tunnel syndrome.  Additionally, we will plan to perform an MRI of the patient's left proximal forearm to further evaluate the dorsal forearm compartment for possible compression neuropathy as well as to evaluate the repair site on the lateral epicondyle.  The patient is agreeable with this plan, all questions were answered.    Follow-Up:   Return in about 4 weeks (around 12/23/2024) for Recheck - after emg/ncs and MRI.     Procedure:  Procedures     Phan Young PA-C

## 2024-12-05 ENCOUNTER — HOSPITAL ENCOUNTER (OUTPATIENT)
Dept: MRI IMAGING | Facility: HOSPITAL | Age: 34
Discharge: HOME | End: 2024-12-05
Admitting: PHYSICIAN ASSISTANT
Payer: MEDICAID

## 2024-12-05 DIAGNOSIS — M77.12 LATERAL EPICONDYLITIS OF LEFT ELBOW: ICD-10-CM

## 2024-12-05 DIAGNOSIS — G56.32 RADIAL TUNNEL SYNDROME OF LEFT UPPER EXTREMITY: ICD-10-CM

## 2024-12-05 PROCEDURE — 73218 MRI UPPER EXTREMITY W/O DYE: CPT

## 2024-12-09 ENCOUNTER — TELEMEDICINE (OUTPATIENT)
Dept: FAMILY MEDICINE CLINIC | Age: 34
End: 2024-12-09
Payer: MEDICAID

## 2024-12-09 DIAGNOSIS — F90.2 ATTENTION DEFICIT HYPERACTIVITY DISORDER (ADHD), COMBINED TYPE: ICD-10-CM

## 2024-12-09 DIAGNOSIS — J45.20 MILD INTERMITTENT ASTHMA, UNSPECIFIED WHETHER COMPLICATED: Primary | ICD-10-CM

## 2024-12-09 PROCEDURE — 99214 OFFICE O/P EST MOD 30 MIN: CPT | Performed by: FAMILY MEDICINE

## 2024-12-09 RX ORDER — NEBULIZER ACCESSORIES
1 KIT MISCELLANEOUS DAILY PRN
Qty: 1 KIT | Refills: 5 | Status: SHIPPED | OUTPATIENT
Start: 2024-12-09

## 2024-12-09 RX ORDER — DEXTROAMPHETAMINE SACCHARATE, AMPHETAMINE ASPARTATE, DEXTROAMPHETAMINE SULFATE AND AMPHETAMINE SULFATE 2.5; 2.5; 2.5; 2.5 MG/1; MG/1; MG/1; MG/1
10 TABLET ORAL 2 TIMES DAILY
Qty: 60 TABLET | Refills: 0 | Status: SHIPPED | OUTPATIENT
Start: 2024-12-09 | End: 2025-12-09

## 2024-12-09 RX ORDER — DEXTROAMPHETAMINE SACCHARATE, AMPHETAMINE ASPARTATE, DEXTROAMPHETAMINE SULFATE AND AMPHETAMINE SULFATE 2.5; 2.5; 2.5; 2.5 MG/1; MG/1; MG/1; MG/1
10 TABLET ORAL 2 TIMES DAILY
Qty: 60 TABLET | Refills: 0 | Status: SHIPPED | OUTPATIENT
Start: 2025-01-07 | End: 2026-01-07

## 2024-12-09 ASSESSMENT — ENCOUNTER SYMPTOMS
BACK PAIN: 0
COUGH: 1
RHINORRHEA: 0
CONSTIPATION: 0
DIARRHEA: 0
NAUSEA: 0
SHORTNESS OF BREATH: 0
ABDOMINAL PAIN: 0
VOMITING: 0
WHEEZING: 1

## 2024-12-09 NOTE — PROGRESS NOTES
Mele Gonzalez, was evaluated through a synchronous (real-time) audio-video encounter. The patient (or guardian if applicable) is aware that this is a billable service, which includes applicable co-pays. This Virtual Visit was conducted with patient's (and/or legal guardian's) consent. Patient identification was verified, and a caregiver was present when appropriate.   The patient was located at Home: [address] [place] [address] [place] [address]  Provider was located at Facility (Appt Dept): [address] [place] [id number]  Confirm you are appropriately licensed, registered, or certified to deliver care in the state where the patient is located as indicated above. If you are not or unsure, please re-schedule the visit: Yes, I confirm.     Mele Gonzalez (:  1990) is a Established patient, presenting virtually for evaluation of the following:      Below is the assessment and plan developed based on review of pertinent history, physical exam, labs, studies, and medications.     Assessment & Plan  Attention deficit hyperactivity disorder (ADHD), combined type       Orders:    amphetamine-dextroamphetamine (ADDERALL, 10MG,) 10 MG tablet; Take 1 tablet by mouth 2 times daily. Max Daily Amount: 20 mg    amphetamine-dextroamphetamine (ADDERALL, 10MG,) 10 MG tablet; Take 1 tablet by mouth 2 times daily. Max Daily Amount: 20 mg    Mild intermittent asthma, unspecified whether complicated       Orders:    Respiratory Therapy Supplies (NEBULIZER/TUBING/MOUTHPIECE) KIT; 1 kit by Does not apply route daily as needed (use for asthma treatments)    With patient needing his nebulizer equipment for his asthma treatments we will get the prescription sent into Medicare home medical at his request.  Discussed asthma exacerbation management and signs symptoms that would require further medical attention.  With patient doing well with his current use of Adderall we will continue at this time with a prescription

## 2024-12-16 ENCOUNTER — HOSPITAL ENCOUNTER (OUTPATIENT)
Dept: NEUROLOGY | Facility: HOSPITAL | Age: 34
Discharge: HOME | End: 2024-12-16
Admitting: PHYSICIAN ASSISTANT
Payer: MEDICAID

## 2024-12-16 DIAGNOSIS — G56.32 RADIAL TUNNEL SYNDROME OF LEFT UPPER EXTREMITY: ICD-10-CM

## 2024-12-16 PROCEDURE — 95886 MUSC TEST DONE W/N TEST COMP: CPT

## 2024-12-16 PROCEDURE — 95911 NRV CNDJ TEST 9-10 STUDIES: CPT

## 2024-12-18 ENCOUNTER — OFFICE VISIT (OUTPATIENT)
Age: 34
End: 2024-12-18
Payer: MEDICAID

## 2024-12-18 VITALS — HEIGHT: 74 IN | BODY MASS INDEX: 30.67 KG/M2 | WEIGHT: 239 LBS

## 2024-12-18 DIAGNOSIS — M25.522 LEFT ELBOW PAIN: ICD-10-CM

## 2024-12-18 DIAGNOSIS — M77.12 LATERAL EPICONDYLITIS OF LEFT ELBOW: Primary | ICD-10-CM

## 2024-12-18 RX ORDER — ALBUTEROL SULFATE 90 UG/1
2 AEROSOL, METERED RESPIRATORY (INHALATION) EVERY 6 HOURS PRN
COMMUNITY
Start: 2024-12-06

## 2024-12-20 ENCOUNTER — TELEPHONE (OUTPATIENT)
Age: 34
End: 2024-12-20

## 2025-01-24 ENCOUNTER — OFFICE VISIT (OUTPATIENT)
Age: 35
End: 2025-01-24
Payer: MEDICAID

## 2025-01-24 VITALS — BODY MASS INDEX: 26.82 KG/M2 | WEIGHT: 209 LBS | HEIGHT: 74 IN

## 2025-01-24 DIAGNOSIS — M77.12 LATERAL EPICONDYLITIS OF LEFT ELBOW: Primary | ICD-10-CM

## 2025-01-24 PROCEDURE — 99214 OFFICE O/P EST MOD 30 MIN: CPT | Performed by: ORTHOPAEDIC SURGERY

## 2025-01-24 NOTE — PROGRESS NOTES
[facility]  [facility] ORTHOPEDICS  [address]  [facility] [id number] [phone]     Patient: Mele Gonzalez   YOB: 1990   Date: 1/27/2025     Chief Complaint   Patient presents with    Follow-up     Left elbow        History of Present Illness  Mele is a 34 y.o. male right-hand-dominant who presents today as a second opinion for ongoing left lateral elbow pain after undergoing prior common extensor tendon repair with Dr. Bradshaw for symptoms consistent with lateral epicondylitis in June 2024.  He states that he had no relief at any point in time postoperatively.  He returns today with ongoing lateral sided left elbow pain which is relieved with pressure applied over the radial tunnel.  He has had a postoperative MRI obtained in December at [facility] which revealed expected postoperative changes.  He presents today and reports ongoing, stable left elbow pain.  He denies any mechanical block to motion or subjective instability.  Of note, he has had a prior traumatic injury to his right hand while using a table saw when he was 17 years old resulting in partial amputations of right ring and little fingers.    Past Medical History:   Diagnosis Date    ADHD (attention deficit hyperactivity disorder)     Arthritis     BiPAP (biphasic positive airway pressure) dependence     no longer since wt loss    GERD (gastroesophageal reflux disease)     Hypertension     none since wt. loss    Obstructive sleep apnea     AHI:  24.3    Restless leg syndrome       Past Surgical History:   Procedure Laterality Date    BICEPS TENDON REPAIR Left 06/18/2024    LEFT ELBOW LATERAL EPICONDYLECTOMY WITH COMMON EXTENSOR TENDON REPAIR performed by Jovi Bradshaw MD at [facility] OR    COLONOSCOPY      CYST REMOVAL      lower back    ELBOW SURGERY  6/18/24    Lateral epidcondile repair    ENDOSCOPY, COLON, DIAGNOSTIC      HAND SURGERY      5 total surgeries

## 2025-01-24 NOTE — PROGRESS NOTES
FOR EXAM: chronic left proximal forearm pain; M77.12-Lateral  epicondylitis, left elbow; G56.32-Lesion of radial nerve, left upper  limb.The patient is status post partial lateral epicondylectomy with  common extensor tendon repair.     COMPARISON: Outside MRI 6/7/2024 radiograph 11/25/2024     TECHNIQUE: Multiplanar, multiphasic images of the LEFT proximal forearm  were obtained. No intravenous contrast was administered.     FINDINGS:     BONE MARROW: Increased T2 signal along the ulnar margin of the radial  head, anteriorly, consistent with arthritis at the radiocapitellar joint  space. I don't see any evidence of an acute fracture. No other marrow  signal abnormality is seen.     JOINTS: This examination was not protocoled as an elbow examination and  therefore field-of-view is larger and visualization of the structures  within the elbow is suboptimal. With that being said, I suspect probable  full-thickness tearing of the radial collateral ligament and possibly  the lateral ulnar collateral ligament as well.     MUSCLES AND TENDONS: Postoperative changes to the lateral aspect of the  elbow at the common extensor origin. Unfortunately there is quite a bit  of metal/susceptibility artifact from the recent surgery, limiting  characterization of the proximal aspect of the common extensor origin.  Very little if any surrounding soft tissue edema at the operative site  as well. There is distention of the lateral aspect of the joint most  pronounced along the posterolateral aspect of the joint with joint fluid  emanating from the expected location of the posterolateral aspect of the  joint. This fluid appears to wrap around the posterolateral aspect of  the radial head as well. The most anterior insertional fibers of the  common extensor origin are likely intact. More posterior fibers are not  definitively seen inserting on the lateral epicondyle. Visualization of  this area is suboptimal because of metal artifact.

## 2025-01-25 SDOH — ECONOMIC STABILITY: FOOD INSECURITY: WITHIN THE PAST 12 MONTHS, THE FOOD YOU BOUGHT JUST DIDN'T LAST AND YOU DIDN'T HAVE MONEY TO GET MORE.: SOMETIMES TRUE

## 2025-01-25 SDOH — ECONOMIC STABILITY: INCOME INSECURITY: IN THE LAST 12 MONTHS, WAS THERE A TIME WHEN YOU WERE NOT ABLE TO PAY THE MORTGAGE OR RENT ON TIME?: NO

## 2025-01-25 SDOH — ECONOMIC STABILITY: FOOD INSECURITY: WITHIN THE PAST 12 MONTHS, YOU WORRIED THAT YOUR FOOD WOULD RUN OUT BEFORE YOU GOT MONEY TO BUY MORE.: SOMETIMES TRUE

## 2025-01-25 SDOH — ECONOMIC STABILITY: TRANSPORTATION INSECURITY
IN THE PAST 12 MONTHS, HAS THE LACK OF TRANSPORTATION KEPT YOU FROM MEDICAL APPOINTMENTS OR FROM GETTING MEDICATIONS?: NO

## 2025-01-25 ASSESSMENT — PATIENT HEALTH QUESTIONNAIRE - PHQ9
SUM OF ALL RESPONSES TO PHQ9 QUESTIONS 1 & 2: 0
SUM OF ALL RESPONSES TO PHQ QUESTIONS 1-9: 0
1. LITTLE INTEREST OR PLEASURE IN DOING THINGS: NOT AT ALL
SUM OF ALL RESPONSES TO PHQ QUESTIONS 1-9: 0
1. LITTLE INTEREST OR PLEASURE IN DOING THINGS: NOT AT ALL
2. FEELING DOWN, DEPRESSED OR HOPELESS: NOT AT ALL
SUM OF ALL RESPONSES TO PHQ QUESTIONS 1-9: 0
SUM OF ALL RESPONSES TO PHQ9 QUESTIONS 1 & 2: 0
2. FEELING DOWN, DEPRESSED OR HOPELESS: NOT AT ALL
SUM OF ALL RESPONSES TO PHQ QUESTIONS 1-9: 0

## 2025-01-27 ENCOUNTER — OFFICE VISIT (OUTPATIENT)
Age: 35
End: 2025-01-27
Payer: MEDICAID

## 2025-01-27 VITALS — HEIGHT: 74 IN | WEIGHT: 209 LBS | BODY MASS INDEX: 26.82 KG/M2

## 2025-01-27 DIAGNOSIS — M77.12 LATERAL EPICONDYLITIS OF LEFT ELBOW: Primary | ICD-10-CM

## 2025-01-27 PROCEDURE — 99213 OFFICE O/P EST LOW 20 MIN: CPT | Performed by: ORTHOPAEDIC SURGERY

## 2025-01-28 ENCOUNTER — OFFICE VISIT (OUTPATIENT)
Age: 35
End: 2025-01-28
Payer: MEDICAID

## 2025-01-28 VITALS
HEART RATE: 83 BPM | TEMPERATURE: 99.7 F | HEIGHT: 74 IN | BODY MASS INDEX: 26.95 KG/M2 | DIASTOLIC BLOOD PRESSURE: 74 MMHG | WEIGHT: 210 LBS | OXYGEN SATURATION: 97 % | SYSTOLIC BLOOD PRESSURE: 132 MMHG

## 2025-01-28 DIAGNOSIS — M25.522 CHRONIC ELBOW PAIN, LEFT: ICD-10-CM

## 2025-01-28 DIAGNOSIS — G89.29 CHRONIC ELBOW PAIN, LEFT: ICD-10-CM

## 2025-01-28 DIAGNOSIS — F90.2 ATTENTION DEFICIT HYPERACTIVITY DISORDER (ADHD), COMBINED TYPE: ICD-10-CM

## 2025-01-28 DIAGNOSIS — M77.12 LATERAL EPICONDYLITIS OF LEFT ELBOW: Primary | ICD-10-CM

## 2025-01-28 PROCEDURE — 3075F SYST BP GE 130 - 139MM HG: CPT | Performed by: FAMILY MEDICINE

## 2025-01-28 PROCEDURE — 3078F DIAST BP <80 MM HG: CPT | Performed by: FAMILY MEDICINE

## 2025-01-28 PROCEDURE — 99214 OFFICE O/P EST MOD 30 MIN: CPT | Performed by: FAMILY MEDICINE

## 2025-01-28 RX ORDER — DEXTROAMPHETAMINE SACCHARATE, AMPHETAMINE ASPARTATE, DEXTROAMPHETAMINE SULFATE AND AMPHETAMINE SULFATE 2.5; 2.5; 2.5; 2.5 MG/1; MG/1; MG/1; MG/1
10 TABLET ORAL 2 TIMES DAILY
Qty: 60 TABLET | Refills: 0 | Status: CANCELLED | OUTPATIENT
Start: 2025-01-28 | End: 2026-01-28

## 2025-01-28 RX ORDER — DEXTROAMPHETAMINE SACCHARATE, AMPHETAMINE ASPARTATE, DEXTROAMPHETAMINE SULFATE AND AMPHETAMINE SULFATE 2.5; 2.5; 2.5; 2.5 MG/1; MG/1; MG/1; MG/1
10 TABLET ORAL 2 TIMES DAILY
Qty: 180 TABLET | Refills: 0 | Status: SHIPPED | OUTPATIENT
Start: 2025-01-28 | End: 2026-01-28

## 2025-01-28 SDOH — ECONOMIC STABILITY: FOOD INSECURITY: WITHIN THE PAST 12 MONTHS, YOU WORRIED THAT YOUR FOOD WOULD RUN OUT BEFORE YOU GOT MONEY TO BUY MORE.: SOMETIMES TRUE

## 2025-01-28 SDOH — ECONOMIC STABILITY: FOOD INSECURITY: WITHIN THE PAST 12 MONTHS, THE FOOD YOU BOUGHT JUST DIDN'T LAST AND YOU DIDN'T HAVE MONEY TO GET MORE.: NEVER TRUE

## 2025-01-28 NOTE — PROGRESS NOTES
.   [facility] MEDICINE, INTERNAL MEDICINE AND PEDIATRICS  [address] [place] [address]  [address]  [place] [address]  Dept: [phone]  Dept Fax: [phone]    Visit type: Established patient    Reason for Visit: Medication Refill      Assessment & Plan   Assessment and Plan       Assessment & Plan  Attention deficit hyperactivity disorder (ADHD), combined type       Orders:    amphetamine-dextroamphetamine (ADDERALL, 10MG,) 10 MG tablet; Take 1 tablet by mouth 2 times daily. Max Daily Amount: 20 mg    Lateral epicondylitis of left elbow            Chronic elbow pain, left            With patient doing well with his current use of Adderall we will continue at this time with a prescription into the pharmacy and assuming no issues arise we will plan on follow-up in 3 months for continued monitoring and management of his ADHD.  With patient 1 to get set up with an orthopedic surgeon in a larger medical area discussed options moving forward and patient is wanting to do some research he is going to get back to his son who would like to see at which time we will attempt to get him referred and get him set up for evaluation and recommendations pertaining to his left elbow.  Discussed lifestyle modifications that may be beneficial for his symptoms.  Discussed signs symptoms requiring medical attention.  All questions were answered and patient voiced understanding and agreement with plan as discussed.      Return in about 3 months (around 4/28/2025), or if symptoms worsen or fail to improve.       Subjective       HPI   Patient has ADHD and is doing well with his current dose of  Adderall.  he reports that the medicine allows him to stay focused and complete his tasks.  Without the medication he has more problems remaining focused and doing the things that he needs to do. he denies any issues with the medication and reports that he continues to have a normal appetite and denies any changes with his sleep with

## 2025-01-30 DIAGNOSIS — M77.12 LATERAL EPICONDYLITIS, LEFT ELBOW: Primary | ICD-10-CM

## 2025-01-30 ASSESSMENT — ENCOUNTER SYMPTOMS
NAUSEA: 0
VOMITING: 0
BACK PAIN: 0
WHEEZING: 0
CONSTIPATION: 0
ABDOMINAL PAIN: 0
DIARRHEA: 0
RHINORRHEA: 0
COUGH: 0
SHORTNESS OF BREATH: 0

## 2025-05-14 ENCOUNTER — OFFICE VISIT (OUTPATIENT)
Age: 35
End: 2025-05-14

## 2025-05-14 VITALS
SYSTOLIC BLOOD PRESSURE: 144 MMHG | TEMPERATURE: 98.4 F | WEIGHT: 210 LBS | DIASTOLIC BLOOD PRESSURE: 68 MMHG | HEIGHT: 74 IN | OXYGEN SATURATION: 98 % | BODY MASS INDEX: 26.95 KG/M2 | HEART RATE: 65 BPM

## 2025-05-14 DIAGNOSIS — M77.12 LATERAL EPICONDYLITIS OF LEFT ELBOW: ICD-10-CM

## 2025-05-14 DIAGNOSIS — Z79.899 MEDICATION MANAGEMENT: ICD-10-CM

## 2025-05-14 DIAGNOSIS — F90.2 ATTENTION DEFICIT HYPERACTIVITY DISORDER (ADHD), COMBINED TYPE: ICD-10-CM

## 2025-05-14 DIAGNOSIS — Z79.899 MEDICATION MANAGEMENT: Primary | ICD-10-CM

## 2025-05-14 RX ORDER — DEXTROAMPHETAMINE SACCHARATE, AMPHETAMINE ASPARTATE, DEXTROAMPHETAMINE SULFATE AND AMPHETAMINE SULFATE 2.5; 2.5; 2.5; 2.5 MG/1; MG/1; MG/1; MG/1
10 TABLET ORAL 2 TIMES DAILY
Qty: 180 TABLET | Refills: 0 | Status: SHIPPED | OUTPATIENT
Start: 2025-05-14 | End: 2026-05-14

## 2025-05-14 NOTE — PROGRESS NOTES
Mele Gonzalez (:  1990) is a 34 y.o. male, Established patient, here for evaluation of the following chief complaint(s):  Medication Management, Referral - General ([facility] referral sent, pt never heard from them. ), and Stress         Assessment & Plan  1. Attention-deficit/hyperactivity disorder (ADHD): Stable.  - Adderall 10 mg twice a day is effective in maintaining focus and completing tasks.  - No issues with eating or sleeping while on medication.  - Prescription for a 3-month supply of Adderall 10 mg twice a day sent to pharmacy.  - PDMP checked and is as expected.    2. Referral to orthopedics.  - Referral to orthopedic specialist placed back in January with no response.  - Referral placed again to the same specialist.  - Advised to call the orthopedic office if no response within a couple of weeks and mention multiple form submissions.  - Discussed previous difficulties in obtaining an appointment due to prior surgery and MRI results showing metal artifact.    Follow-up  - Referral placed again to the orthopedic specialist.  - Advised to call the orthopedic office if no response within a couple of weeks.    Results  Imaging   - MRI: Metal artifact  1. Medication management  -     Drug Screen with Reflex to Quantitation, Ur; Future  2. Attention deficit hyperactivity disorder (ADHD), combined type  -     amphetamine-dextroamphetamine (ADDERALL, 10MG,) 10 MG tablet; Take 1 tablet by mouth 2 times daily. Max Daily Amount: 20 mg, Disp-180 tablet, R-0Normal  3. Lateral epicondylitis of left elbow  -     External Referral To Orthopedic Surgery    Return in about 3 months (around 2025), or if symptoms worsen or fail to improve.         Subjective   History of Present Illness  The patient presents for a medication refill and a possible referral to orthopedics.    He has been experiencing difficulties in obtaining a referral to an orthopedic specialist. Despite seeking assistance from three different

## 2025-05-21 LAB
AMPHET CTO UR CFM-MCNC: NEGATIVE NG/ML
BARBITURATES CTO UR CFM-MCNC: NEGATIVE NG/ML
BENZODIAZ CTO UR CFM-MCNC: NEGATIVE NG/ML
BUPRENORPHINE UR QL SCN: NEGATIVE NG/ML
CANNABINOIDS CTO UR CFM-MCNC: NORMAL NG/ML
CARBOXYTHC UR-MCNC: >500 NG/ML
CARISOPRODOL UR QL: NEGATIVE NG/ML
COCAINE CTO UR CFM-MCNC: NEGATIVE NG/ML
CREAT UR-MCNC: 115.4 MG/DL (ref 20–400)
DRUG SCREEN COMMENT UR-IMP: NORMAL
ETHYL GLUCURONIDE UR QL SCN: NEGATIVE NG/ML
FENTANYL UR QL: NEGATIVE NG/ML
MEPERIDINE UR QL: NEGATIVE NG/ML
METHADONE CTO UR CFM-MCNC: NEGATIVE NG/ML
OPIATES UR QL SCN: NEGATIVE NG/ML
OXYCODONE SCREEN URINE: NEGATIVE NG/ML
PCP CTO UR CFM-MCNC: NEGATIVE NG/ML
PROPOXYPH CTO UR CFM-MCNC: NEGATIVE NG/ML
TAPENTADOL UR QL SCN: NEGATIVE NG/ML
TRAMADOL SCREEN URINE: NEGATIVE NG/ML
ZOLPIDEM UR QL SCN: NEGATIVE NG/ML

## 2025-05-27 ENCOUNTER — RESULTS FOLLOW-UP (OUTPATIENT)
Age: 35
End: 2025-05-27

## 2025-06-03 NOTE — PROGRESS NOTES
[facility] Sports Medicine  Vasiliy Young MD, PhD  Phan Young PA-C    Chief Complaint:   Chief Complaint   Patient presents with    Left Elbow - Follow-up     Patient presents to the office today for left elbow follow up. MRI at [facility] on 12/05/2024. NCS/EMG at [facility] on 12/16/2024. Patient states no changes in symptoms.        History of Present Illness:   The patient is a 34-year-old who presents with continued complaints of left lateral elbow pain, proximal forearm pain that travels down the arm, and occasional hand pain and weakness.  He underwent surgical repair of a torn common extensor tendon as well as a lateral epicondylectomy for chronic lateral epicondylitis in the past and unfortunately has not had much improvement in his symptoms over the last few years with this despite a long conservative treatment course to include multiple rounds of physical therapy.  Medically he has also tried anti-inflammatories and has a gastric sleeve so tries to avoid NSAIDs is much as possible.  He has also tried a course of gabapentin for potential neuritis which has not alleviated his symptoms.  He recently underwent an EMG and nerve conduction study due to concerns over radial tunnel syndrome and had an updated MRI of his left proximal forearm to further evaluate the forearm musculature as well as the surgical repair site.  He states his symptoms remain unchanged from the prior visit.  He is wanting to discuss potential further surgical treatment options versus nonsurgical treatment options regarding his pain    Past Medical History:   Past Medical History:   Diagnosis Date    Anxiety         Past Surgical History:  Past Surgical History:   Procedure Laterality Date    GASTRIC SLEEVE LAPAROSCOPIC      LATERAL EPICONDYLE RELEASE Left         Social History:   Social History     Socioeconomic History    Marital status:    Tobacco Use    Smoking status: Never    Smokeless tobacco: Current     Types: Chew    Substance and Sexual Activity    Alcohol use: Yes     Comment: rare        Medications:  Current Outpatient Medications   Medication Instructions    amphetamine-dextroamphetamine (ADDERALL) 10 MG tablet 10 mg, 2 Times Daily    Ventolin  (90 Base) MCG/ACT inhaler 2 puffs, Every 6 Hours PRN        Allergies:  No Known Allergies    Vital Signs:  There were no vitals filed for this visit.  Body mass index is 30.69 kg/m².     Review of Systems:   Review of Systems    Physical Exam:  Vital signs reviewed.   General: No acute distress. Cooperative with exam.   Eyes: conjunctiva clear; pupils equally round and reactive  ENT: external ears and nose atraumatic; oropharynx clear  CV: no peripheral edema, 2+ distal pulses  Resp: normal respiratory effort, no use of accessory muscles  Skin: no rashes or wounds; normal turgor  Psych: mood and affect appropriate; recent and remote memory intact  Neuro: sensation to light touch intact, no focal neurological deficit  Musculoskeletal: On inspection of the patient's left elbow and forearm he has an incision along the lateral epicondyle consistent with prior surgery in the area there is general tenderness to palpation in the area as well as the radiocapitellar joint.  He primarily has tenderness to palpation and mild swelling along the dorsal aspect of his forearm.  He exhibits weakness, 4 out of 5, with resisted wrist extension and third digit extension.  This also exhibits pain.  Wrist flexion is intact.  Elbow range of motion is well-maintained with flexion extension, pronation supination do cause mild pain.  Distal biceps tendon is intact.  There is numbness with light touch along the dorsal aspect of the hand and digits as well as the volar surface of the hand and digits.  This is in a nonspecific pattern.     Physical Exam     Results Review:   IMPRESSION:     1.  Limited examination at the level of the elbow because of  field-of-view and metal artifact from the  operative site.     2.  At the previous common extensor repair site, I do not definitively  see the posterior fibers inserting on the lateral humeral epicondyle.  There is quite a bit of metal artifact in this region making  visualization suboptimal; however, I do see joint fluid emanating from  the lateral aspect of the radiocapitellar joint towards the  posterolateral aspect of the elbow at the expected location of the  traversing radial collateral ligament, lateral ulnar collateral  ligament, and common extensor origins.          3.  There is arthritis at the radiocapitellar joint as discussed above.     4.  The posterior cutaneous nerve of the forearm extends either through  or adjacent to the operative bed and cannot be followed because of metal  artifact in this region. No definite signal abnormality within the  visualized portion of the nerve. The other nerves are grossly normal in  signal as well throughout their visualized segments.     This report was signed and finalized on 12/5/2024 11:48 AM by Dr. Hamilton Mao MD.    An EMG nerve conduction study shows a normal EMG with an abnormal nerve conduction study demonstrating evidence of mild median nerve compromise at the wrist on the left side.    I have also personally reviewed the patient's left proximal forearm MRI which demonstrates arthritis in the radiocapitellar joint, edema along the lateral elbow and prior surgical repair site with difficulty fully evaluating the prior repair site.    Assessment:   Diagnoses and all orders for this visit:    1. Lateral epicondylitis of left elbow (Primary)  -     Ambulatory Referral to Orthopedic Surgery    2. Left elbow pain  -     Ambulatory Referral to Orthopedic Surgery       Plan:  The patient underwent repair of the left common extensor tendon and lateral epicondylectomy with Dr. Rivera and has unfortunately had residual symptoms in the left elbow and down the forearm.  We have discussed several treatment  options both operative and nonoperative today.  From a nonoperative standpoint, have discussed the possibility of diagnostic injections to include a possible carpal tunnel injection given the median nerve compromise on his nerve conduction study, a radiocapitellar injection given the arthritis in the area based on his MRI findings, as well as a diagnostic injection for radial tunnel syndrome as his symptoms do point towards this as a potential cause but this is not necessarily supported by his imaging.  We have discussed the possibility of gabapentin but the patient is tried this in the past and states it did not alleviate his symptoms.  He is hoping to avoid use of NSAIDs due to history of gastric sleeve as well.  Medically, our options are somewhat limited because of this.  Additionally, we have discussed the possibility of seeking a second surgical opinion for possible revision, extensor tendon repair surgery and he has ultimately elected to proceed with this prior to further nonoperative treatment although this remains an option in the future.  Will plan to send the patient to Dr. Lewis with sports medicine at [facility] for a second surgical opinion.  The patient is agreeable with this plan, all questions were answered.    Follow-Up:   Return if symptoms worsen or fail to improve.     Procedure:  Procedures     Phan Young PA-C      Xray Chest 1 View- PORTABLE-Urgent

## (undated) DEVICE — AMBU AURA-I U SIZE 4, DISPOSABLE LARYNGEAL MASK: Brand: AURA-I

## (undated) DEVICE — SUTURE VICRYL + SZ 3-0 L27IN ABSRB UD L26MM SH 1/2 CIR VCP416H

## (undated) DEVICE — BANDAGE COMPR W6XL80IN COT E 1 LAYR CLP CLSR PREM GRD CONTEX

## (undated) DEVICE — SURGICAL PROCEDURE PACK LOWER EXTREMITY LOURDES HOSP

## (undated) DEVICE — Device

## (undated) DEVICE — NEPTUNE E-SEP SMOKE EVACUATION PENCIL, COATED, 70MM BLADE, ROCKER SWITCH: Brand: NEPTUNE E-SEP

## (undated) DEVICE — SUTURE VICRYL + SZ 0 L27IN ABSRB UD CT-1 L36MM 1/2 CIR TAPR VCP260H

## (undated) DEVICE — GLOVE SURG SZ 8 CRM LTX FREE POLYISOPRENE POLYMER BEAD ANTI

## (undated) DEVICE — 3M™ COBAN™ NL STERILE NON-LATEX SELF-ADHERENT WRAP, 2086S, 6 IN X 5 YD (15 CM X 4,5 M), 12 ROLLS/CASE: Brand: 3M™ COBAN™

## (undated) DEVICE — UNDERGLOVE SURG SZ 8 FNGR THK0.21MIL GRN LTX BEAD CUF

## (undated) DEVICE — LIQUIBAND RAPID ADHESIVE 36/CS 0.8ML: Brand: MEDLINE

## (undated) DEVICE — ZIMMER® STERILE DISPOSABLE TOURNIQUET CUFF WITH PLC, DUAL PORT, SINGLE BLADDER, 18 IN. (46 CM)

## (undated) DEVICE — PAD,ABDOMINAL,8"X10",ST,LF: Brand: MEDLINE